# Patient Record
Sex: MALE | Race: WHITE | Employment: UNEMPLOYED | URBAN - METROPOLITAN AREA
[De-identification: names, ages, dates, MRNs, and addresses within clinical notes are randomized per-mention and may not be internally consistent; named-entity substitution may affect disease eponyms.]

---

## 2020-09-29 ENCOUNTER — VIRTUAL VISIT (OUTPATIENT)
Dept: FAMILY MEDICINE CLINIC | Age: 35
End: 2020-09-29
Payer: MEDICAID

## 2020-09-29 DIAGNOSIS — Z13.220 SCREENING FOR HYPERLIPIDEMIA: ICD-10-CM

## 2020-09-29 DIAGNOSIS — G40.409 GRAND MAL SEIZURE (HCC): Primary | ICD-10-CM

## 2020-09-29 PROCEDURE — 99203 OFFICE O/P NEW LOW 30 MIN: CPT | Performed by: FAMILY MEDICINE

## 2020-09-29 RX ORDER — LAMOTRIGINE 100 MG/1
300 TABLET ORAL 2 TIMES DAILY
COMMUNITY
End: 2020-10-02 | Stop reason: SDUPTHER

## 2020-09-29 NOTE — PROGRESS NOTES
Pedro Chambers is a 37 y.o. male who was seen by synchronous (real-time) audio-video technology on 9/29/2020 for New Patient and Medication Refill (lamotrigine)    He is calling in to establish care  He needs a refill on lamotrigine  He has grand mal seizures   he does not have a neurologist , moved here a year ago  Asking for medical marijuana card here in [place]    Seizures started in college at 30, never as a child  Last one was 4 years ago   no other chronic conditions      Assessment & Plan:   Diagnoses and all orders for this visit:    1. Grand mal seizure ([address]  -     [facility]; Future    2. Screening for hyperlipidemia  -     LIPID PANEL; Future            Subjective:       Prior to Admission medications    Medication Sig Start Date End Date Taking? Authorizing Provider   lamoTRIgine (LaMICtal) 100 mg tablet Take 100 mg by mouth two (2) times a day. Yes Provider, Historical     There are no active problems to display for this patient. Current Outpatient Medications   Medication Sig Dispense Refill    lamoTRIgine (LaMICtal) 100 mg tablet Take 100 mg by mouth two (2) times a day.          ROS    Objective:     Patient-Reported Vitals 9/29/2020   Patient-Reported Weight 200lb        [INSTRUCTIONS:  \"[x]\" Indicates a positive item  \"[]\" Indicates a negative item  -- DELETE ALL ITEMS NOT EXAMINED]    Constitutional: [x] Appears well-developed and well-nourished [x] No apparent distress      [] Abnormal -     Mental status: [x] Alert and awake  [x] Oriented to person/place/time [x] Able to follow commands    [] Abnormal -     Eyes:   EOM    [x]  Normal    [] Abnormal -   Sclera  [x]  Normal    [] Abnormal -          Discharge [x]  None visible   [] Abnormal -     HENT: [x] Normocephalic, atraumatic  [] Abnormal -   [x] Mouth/Throat: Mucous membranes are moist    External Ears [x] Normal  [] Abnormal -    Neck: [x] No visualized mass [] Abnormal -     Pulmonary/Chest: [x] Respiratory effort normal   [x] No visualized signs of difficulty breathing or respiratory distress        [] Abnormal -      Musculoskeletal:   [x] Normal gait with no signs of ataxia         [x] Normal range of motion of neck        [] Abnormal -     Neurological:        [x] No Facial Asymmetry (Cranial nerve 7 motor function) (limited exam due to video visit)          [x] No gaze palsy        [] Abnormal -          Skin:        [x] No significant exanthematous lesions or discoloration noted on facial skin         [] Abnormal -            Psychiatric:       [x] Normal Affect [] Abnormal -        [x] No Hallucinations    Other pertinent observable physical exam findings:-        We discussed the expected course, resolution and complications of the diagnosis(es) in detail. Medication risks, benefits, costs, interactions, and alternatives were discussed as indicated. I advised him to contact the office if his condition worsens, changes or fails to improve as anticipated. He expressed understanding with the diagnosis(es) and plan. Estrellita Lucero, who was evaluated through a patient-initiated, synchronous (real-time) audio-video encounter, and/or his healthcare decision maker, is aware that it is a billable service, with coverage as determined by his insurance carrier. He provided verbal consent to proceed: Yes, and patient identification was verified. It was conducted pursuant to the emergency declaration under the [address] [place] [address] and the [employer] General Act. A caregiver was present when appropriate. Ability to conduct physical exam was limited. I was at home. The patient was at home.       Kayli Mejias MD

## 2020-09-29 NOTE — PROGRESS NOTES
Jorge Najera is a 37 y.o. male      Chief Complaint   Patient presents with    New Patient    Medication Refill     lamotrigine         1. Have you been to the ER, urgent care clinic since your last visit? Hospitalized since your last visit? No      2. Have you seen or consulted any other health care providers outside of the [address] [place] [address] since your last visit? Include any pap smears or colon screening.    No

## 2020-10-02 ENCOUNTER — OFFICE VISIT (OUTPATIENT)
Dept: NEUROLOGY | Age: 35
End: 2020-10-02
Payer: MEDICAID

## 2020-10-02 VITALS
TEMPERATURE: 97 F | SYSTOLIC BLOOD PRESSURE: 124 MMHG | DIASTOLIC BLOOD PRESSURE: 82 MMHG | WEIGHT: 199 LBS | OXYGEN SATURATION: 98 % | HEART RATE: 78 BPM

## 2020-10-02 DIAGNOSIS — G40.909 SEIZURE DISORDER (HCC): Primary | ICD-10-CM

## 2020-10-02 DIAGNOSIS — R41.3 MEMORY DIFFICULTY: ICD-10-CM

## 2020-10-02 PROCEDURE — 99205 OFFICE O/P NEW HI 60 MIN: CPT | Performed by: PSYCHIATRY & NEUROLOGY

## 2020-10-02 RX ORDER — LAMOTRIGINE 100 MG/1
300 TABLET ORAL 2 TIMES DAILY
Qty: 180 TAB | Refills: 0 | Status: SHIPPED | OUTPATIENT
Start: 2020-10-02 | End: 2021-01-26

## 2020-10-02 NOTE — PATIENT INSTRUCTIONS
PRESCRIPTION REFILL POLICY [facility] Statement to Patients April 1, 2014 In an effort to ensure the large volume of patient prescription refills is processed in the most efficient and expeditious manner, we are asking our patients to assist us by calling your Pharmacy for all prescription refills, this will include also your  Mail Order Pharmacy. The pharmacy will contact our office electronically to continue the refill process. Please do not wait until the last minute to call your pharmacy. We need at least 48 hours (2days) to fill prescriptions. We also encourage you to call your pharmacy before going to  your prescription to make sure it is ready. With regard to controlled substance prescription refill requests (narcotic refills) that need to be picked up at our office, we ask your cooperation by providing us with at least 72 hours (3days) notice that you will need a refill. We will not refill narcotic prescription refill requests after 4:00pm on any weekday, Monday through Thursday, or after 2:00pm on Fridays, or on the weekends. We encourage everyone to explore another way of getting your prescription refill request processed using [employer], our patient web portal through our electronic medical record system. [employer] is an efficient and effective way to communicate your medication request directly to the office and  downloadable as an kwame on your smart phone . [employer] also features a review functionality that allows you to view your medication list as well as leave messages for your physician. Are you ready to get connected? If so please review the attatched instructions or speak to any of our staff to get you set up right away! Thank you so much for your cooperation. Should you have any questions please contact our Practice Administrator. The Physicians and Staff,  [facility]

## 2020-10-02 NOTE — PROGRESS NOTES
Name:  Suzan Valladares      :  1985    PCP:   Dev Munoz MD      Referring:  As above  MRN:   [id number]    Chief Complaint:   Chief Complaint   Patient presents with    Seizure       HISTORY OF PRESENT ILLNESS:     This is a 37 y.o. male with  has a past medical history of Seizures ([facility] [address] who presents to discuss Seizure    Pt reports that his first episode of seizure was at age 30, while in [place], [place]. He describe that they occurred in the morning. His wife told him that he was in bed, shaking, bit tongue, got up, confused/ agitated, then paramedics showed up. She told him he had two episodes that morning. Seen by outpatient Neurologist.  He doesn't recall the findings of his EEG test.  Had MRI Brain and doesn't think there was any abnormality. Says he was told he has grand mal seizure but has a poor recollection and cannot tell me the last time he saw the Neurologist.  Rosalinda Enamoradofouziae to [place] in 2018. He tells me he's had a total of about 20 seizures since it all started. Last seizure was 4-5 years ago. Currently on Lamotrigine 100 mg 3 tablets twice a day. Has been on this dose for about 6 years. Triggers: flashing lights make him feel like he's going to have a seizure. Sleeps 6 hrs a night. Doesn't drink much alcohol /infrequent. Current cognitive complaints: poor memory, forgetful, brain jumping from one thing to another. Reports that he had attention and focus difficulty in high school, not diagnosed or treatedDenies any hx of depression. Reports some degree of anxiety related to possibility of having a seizure.       Tried/ failed: Levetiracetam (side effects, not working)  FHx epilepsy: no  Brain injury: no  Hx of concussion: no  Hx of meningitis or brain infection: no    Complete Review of Systems: + anxiety, fatigue ; otherwise as noted in HPI     PMHx:  Past Medical History:   Diagnosis Date    Seizures (HCC)         PSH:  Past Surgical History:   Procedure Laterality Date    HX ANKLE FRACTURE [place]  1994    HX APPENDECTOMY         FHx:  family history is not on file. SHx:  reports that he has never smoked. He has never used smokeless tobacco. He reports previous alcohol use. He reports that he does not use drugs. Allergies:   No Known Allergies    Meds:     Current Outpatient Medications   Medication Sig    lamoTRIgine (LaMICtal) 100 mg tablet Take 3 Tabs by mouth two (2) times a day. PHYSICAL EXAM    Vitals:    10/02/20 0934   BP: 124/82   BP 1 Location: Left arm   BP Patient Position: Sitting   Pulse: 78   Temp: 97 °F (36.1 °C)   SpO2: 98%   Weight: 90.3 kg (199 lb)       General:  Alert, cooperative, NAD   Head:  Normocephalic, atraumatic. Eyes:  Conjunctivae/corneas clear   Lungs:  Heart:  Not examined  Not examined   Extremities: No edema. Skin: No rashes    Neurologic Exam       Language: normal  Memory:  Alert, oriented to person, place, situation    Cranial Nerves:  I: smell Not tested   II: visual fields Full to confrontation   II: pupils Equal, round, reactive to light   II: optic disc Deferred   III,VII: ptosis none   III,IV,VI: extraocular muscles  normal   V: facial light touch sensation  normal   VII: facial muscle function  symmetric   VIII: hearing symmetric   IX: soft palate elevation  Not examined   XI: sternocleidomastoid strength 5/5   XII: tongue  midline      Motor: normal bulk, tone, strength in all exts  Sensory: intact to LT, PP, temp, vibration x 4 exts   Cerebellar: no rest, postural, or intention tremor  Normal FNF and H-Shin bilaterally  Reflexes: 2+ throughout  Plantar response: not examined   Gait: normal gait including tandem  Romberg negative     ASSESSMENT AND PLAN      ICD-10-CM ICD-9-CM    1. Seizure disorder (HCC)  G40.909 345.90 LAMOTRIGINE (LAMICTAL)      CBC WITH AUTOMATED DIFF      EEG AMB NEURO      MRI BRAIN W WO CONT      lamoTRIgine (LaMICtal) 100 mg tablet   2.  Memory difficulty  R41.3 780.93 TSH 3RD GENERATION      VITAMIN B12 & FOLATE      REFERRAL TO PSYCHOLOGY       1) Seizure disorder  Discussed with patient that I would like to get prior neurology records to understand his prior workup/ findings, but he has no recollection of name or group of Neurologists he saw in [place], [place], nor the name of the hospital/ ER he went to when first evaluated for seizure, cannot even remember the last time he saw a neurologist (moved to [place] in 2018). Check EEG for baseline, MRI Brain +/- contrast, lamictal level, CBC. Continue Lamotrigine 100 mg three tabs twice a day for now. 2) Attention, focus, concentration complaints  Pt says preceded the start of his seizures. Check B12, Folate, TSH, referred for neuropsych testing    3) Misc: printed labs orderd by PCP and dwp he can go to [facility] lab and get them all drawn at once. D/w him he needs to be fasting before lab draw as one test is the cholesterol panel.      4) F/u in 3 months    Signed By: Rocio Chauhan MD     October 2, 2020

## 2020-10-12 ENCOUNTER — OFFICE VISIT (OUTPATIENT)
Dept: NEUROLOGY | Age: 35
End: 2020-10-12
Payer: MEDICAID

## 2020-10-12 VITALS — TEMPERATURE: 97.5 F

## 2020-10-12 DIAGNOSIS — Z13.220 SCREENING FOR HYPERLIPIDEMIA: ICD-10-CM

## 2020-10-12 DIAGNOSIS — G40.909 SEIZURE DISORDER (HCC): Primary | ICD-10-CM

## 2020-10-12 DIAGNOSIS — Z86.69 HISTORY OF SEIZURE DISORDER: ICD-10-CM

## 2020-10-12 DIAGNOSIS — G40.409 GRAND MAL SEIZURE (HCC): ICD-10-CM

## 2020-10-12 LAB
ALBUMIN SERPL-MCNC: 4.6 G/DL (ref 3.5–5)
ALBUMIN/GLOB SERPL: 1.6 {RATIO} (ref 1.1–2.2)
ALP SERPL-CCNC: 82 U/L (ref 45–117)
ALT SERPL-CCNC: 27 U/L (ref 12–78)
ANION GAP SERPL CALC-SCNC: 3 MMOL/L (ref 5–15)
AST SERPL-CCNC: 21 U/L (ref 15–37)
BILIRUB SERPL-MCNC: 0.4 MG/DL (ref 0.2–1)
BUN SERPL-MCNC: 12 MG/DL (ref 6–20)
BUN/CREAT SERPL: 14 (ref 12–20)
CALCIUM SERPL-MCNC: 9.5 MG/DL (ref 8.5–10.1)
CHLORIDE SERPL-SCNC: 107 MMOL/L (ref 97–108)
CHOLEST SERPL-MCNC: 181 MG/DL
CO2 SERPL-SCNC: 31 MMOL/L (ref 21–32)
CREAT SERPL-MCNC: 0.84 MG/DL (ref 0.7–1.3)
GLOBULIN SER CALC-MCNC: 2.8 G/DL (ref 2–4)
GLUCOSE SERPL-MCNC: 92 MG/DL (ref 65–100)
HDLC SERPL-MCNC: 56 MG/DL
HDLC SERPL: 3.2 {RATIO} (ref 0–5)
LDLC SERPL CALC-MCNC: 111.2 MG/DL (ref 0–100)
LIPID PROFILE,FLP: ABNORMAL
POTASSIUM SERPL-SCNC: 4.6 MMOL/L (ref 3.5–5.1)
PROT SERPL-MCNC: 7.4 G/DL (ref 6.4–8.2)
SODIUM SERPL-SCNC: 141 MMOL/L (ref 136–145)
TRIGL SERPL-MCNC: 69 MG/DL (ref ?–150)
VLDLC SERPL CALC-MCNC: 13.8 MG/DL

## 2020-10-12 PROCEDURE — 95819 EEG AWAKE AND ASLEEP: CPT | Performed by: PSYCHIATRY & NEUROLOGY

## 2020-10-19 NOTE — PROCEDURES
Name:   Pat Garcia  YOB: 1985  Age:    38 y.o. Sex:    male       Procedure: Outpatient EEG     Location:   [facility]    Date of Interpretation:    10/19/20  Date of Recording: 10-      Interpreting Physician: Lynnette Harper MD     Indication:  38 y.o. male with history of seizure starting at age 30. No prior EEGs on file. This is a baseline EEG recording. Current medications: has a current medication list which includes the following prescription(s): lamotrigine. Technical: Digital EEG recorded in 10-20 international placement system, multiple montages    Interpretation: Patient is described as awake at the outset of this recording. Symmetric. The posterior dominant rhythm reaches 8 to 9 Hz bilaterally. Photic stimulation resulted in a symmetric drop response. Hyperventilation was not performed. Background waxes and wanes consistent with drowsiness. Stage II sleep is noted by the presence of vertex waves. There are no focal areas of slowing or epileptiform discharge or subclinical seizures. Single-lead EKG is unremarkable. Impression: This is a normal awake drowsy and brief sleep EEG recording. Clinical correlation is necessary.        Signed By: Lynnette Harper MD     October 19, 2020

## 2020-10-20 NOTE — PROGRESS NOTES
The LDL cholesterol is a little higher than goal. The goal is less than 100 and your level is 111  Avoid fried food, fast food and junk food. Also move more each day. Try not to sit for more than an hour at a time.  I want to recheck the cholesterol levels in three months  The liver and kidney are normal

## 2020-10-29 ENCOUNTER — TELEPHONE (OUTPATIENT)
Dept: FAMILY MEDICINE CLINIC | Age: 35
End: 2020-10-29

## 2020-10-29 NOTE — TELEPHONE ENCOUNTER
----- Message from Jude Quesada MD sent at 10/19/2020  9:09 PM EDT -----  The LDL cholesterol is a little higher than goal. The goal is less than 100 and your level is 111  Avoid fried food, fast food and junk food. Also move more each day. Try not to sit for more than an hour at a time.  I want to recheck the cholesterol levels in three months  The liver and kidney are normal

## 2020-12-03 ENCOUNTER — TELEPHONE (OUTPATIENT)
Dept: NEUROLOGY | Age: 35
End: 2020-12-03

## 2020-12-03 NOTE — TELEPHONE ENCOUNTER
----- Message from Ariel Jean-Baptiste sent at 12/3/2020 12:03 PM EST -----  Regarding: [place] [facility] telephone  Appointment not available     Patient's first and last name:  Anatoly White  : 1985    Caller's first and last name and relationship to patient (if not the patient): pt    Best contact number: [phone]    Preferred date and time: Any day early morning    Scheduled appointment date and time: 12/3/2020 12PM    Reason for appointment: New Patient    Details to clarify the request: Pt would like to reschedule appointment

## 2021-01-21 ENCOUNTER — TELEPHONE (OUTPATIENT)
Dept: NEUROLOGY | Age: 36
End: 2021-01-21

## 2021-01-21 NOTE — TELEPHONE ENCOUNTER
----- Message from [facility] sent at 1/21/2021  1:56 PM EST -----  Regarding: Dr. Augusto Hunt (if not patient):  N/A      Relationship of caller (if not patient):  N/A      Best contact number(s):  [phone]      Name of medication and dosage if known:  Lamotrigine, 100mg      Is patient out of this medication (yes/no): N      Pharmacy name:  [facility]    Pharmacy listed in chart? (yes/no): Y  Pharmacy phone number:  [phone]      Details to clarify the request:  Patient has a couple days of pills left.       [facility]

## 2021-01-26 DIAGNOSIS — G40.909 SEIZURE DISORDER (HCC): Primary | ICD-10-CM

## 2021-01-26 DIAGNOSIS — R41.3 MEMORY DIFFICULTY: ICD-10-CM

## 2021-01-26 DIAGNOSIS — G40.909 SEIZURE DISORDER (HCC): ICD-10-CM

## 2021-01-26 RX ORDER — LAMOTRIGINE 100 MG/1
300 TABLET ORAL 2 TIMES DAILY
Qty: 84 TAB | Refills: 0 | Status: SHIPPED | OUTPATIENT
Start: 2021-01-26 | End: 2021-02-02 | Stop reason: SDUPTHER

## 2021-01-26 NOTE — TELEPHONE ENCOUNTER
Called patient. Informed him that script was sent to last until next appt and lab work needs to be completed this week for his appt next week. Faxed orders to [facility] at hospitals due to patient being at [facility] tomorrow for procedure. patient will call back if he decides to go to a different [facility]

## 2021-01-26 NOTE — TELEPHONE ENCOUNTER
----- Message from Edgar Dacosta sent at 1/26/2021 10:54 AM EST -----  Regarding: Dr Orion Mclaughlin Message/Vendor Calls    Caller's first and last name: N/A      Reason for call: Pt would like to confirm medication refill request has been sent into his pharmacy      Callback required yes/no and why: yes, to confirm medication refill request has been sent into his pharmacy      Best contact number(s): [phone]      Details to clarify the request: Pt would like to confirm medication refill request for Rx \"Lamotrigine\" of 100 mg has been called into [facility] at [phone]. Pt has spoken with the pharmacy and they still have not received a request. Pt would like a confirmation today if at all possible because he has only 2 days left of his medication.  Pt also has stated is going to be unavailable tomorrow and would like a detailed message to be left if not able to be reached      Hernández Proflako

## 2021-02-01 ENCOUNTER — HOSPITAL ENCOUNTER (OUTPATIENT)
Dept: MRI IMAGING | Age: 36
Discharge: HOME | End: 2021-02-01
Attending: PSYCHIATRY & NEUROLOGY
Payer: MEDICAID

## 2021-02-01 DIAGNOSIS — R41.3 MEMORY DIFFICULTY: ICD-10-CM

## 2021-02-01 DIAGNOSIS — G40.909 SEIZURE DISORDER (HCC): ICD-10-CM

## 2021-02-01 PROCEDURE — 70551 MRI BRAIN STEM W/O DYE: CPT

## 2021-02-02 ENCOUNTER — OFFICE VISIT (OUTPATIENT)
Dept: NEUROLOGY | Age: 36
End: 2021-02-02
Payer: MEDICAID

## 2021-02-02 VITALS — SYSTOLIC BLOOD PRESSURE: 116 MMHG | OXYGEN SATURATION: 97 % | DIASTOLIC BLOOD PRESSURE: 78 MMHG | HEART RATE: 68 BPM

## 2021-02-02 DIAGNOSIS — R90.82 WHITE MATTER ABNORMALITY ON MRI OF BRAIN: Primary | ICD-10-CM

## 2021-02-02 DIAGNOSIS — R26.81 UNSTEADY GAIT: ICD-10-CM

## 2021-02-02 DIAGNOSIS — R41.3 MEMORY DIFFICULTY: ICD-10-CM

## 2021-02-02 DIAGNOSIS — G40.909 SEIZURE DISORDER (HCC): ICD-10-CM

## 2021-02-02 DIAGNOSIS — R20.0 RIGHT LEG NUMBNESS: ICD-10-CM

## 2021-02-02 DIAGNOSIS — H53.8 BLURRY VISION, BILATERAL: ICD-10-CM

## 2021-02-02 PROCEDURE — 99215 OFFICE O/P EST HI 40 MIN: CPT | Performed by: PSYCHIATRY & NEUROLOGY

## 2021-02-02 RX ORDER — LAMOTRIGINE 100 MG/1
300 TABLET ORAL 2 TIMES DAILY
Qty: 180 TAB | Refills: 1 | Status: SHIPPED | OUTPATIENT
Start: 2021-02-02 | End: 2021-03-04

## 2021-02-02 NOTE — PATIENT INSTRUCTIONS
We reviewed your Brain MRI images. There are numerous white matter spots in the brain that are in a pattern/ distribution which suggests multiple sclerosis. We discussed that MS is an autoimmune disorder where your body attacks the nerves (resulting in the white spots in the brain) and clinically can cause numerous / various symptoms. You endorsed symptoms (gait difficulty, vertigo, episodic numbness, blurry vision) that could be seen in MS Discussed that we should check additional MRIs to look for white matter lesions in other areas: MRI Cervical spine with and without contrast, MRI Thoracic spine with and without contrast.  And we should repeat the Brain MRI with contrast to look and see if there are any \"active\" lesions (active lesions enhance with contrast, implying that the patient may be having an MS exacerbation/ flare to account for any new symptoms or a notable increase in symptoms). Multiple Sclerosis (MS): Care Instructions Your Care Instructions Multiple sclerosis, also called MS, is a disease that can affect the brain, spinal cord, and nerves to the eyes. MS can cause problems with muscle control and strength, vision, balance, feeling, and thinking. Whatever your symptoms are, taking medicine correctly and following your doctor's advice for home care can help you maintain your quality of life. Follow-up care is a key part of your treatment and safety. Be sure to make and go to all appointments, and call your doctor if you are having problems. It's also a good idea to know your test results and keep a list of the medicines you take. How can you care for yourself at home? General care · Take your medicines exactly as prescribed. Call your doctor if you think you are having a problem with your medicine. · Use a cane, walker, or scooter if your doctor suggests it. · Keep doing your normal activities as much as you can.  
· If you have problems urinating, press or tap your bladder area to help start urine flow. If you have trouble controlling your urine, plan your fluid intake and activities so that a toilet will be available when you need it. · Spend time with family and friends. Join a support group for people with MS if you want extra help. · Depression is common with this condition. Tell your doctor if you have trouble sleeping, are eating too much or are not hungry, or feel sad or tearful all the time. Depression can be treated with medicine and counseling. Diet and exercise · Eat a balanced diet. · If you have problems swallowing, change how and what you eat: ? Try thick drinks, such as milk shakes. They are easier to swallow than other fluids. ? Do not eat foods that crumble easily. These can cause choking. ? Use a  to prepare food. Soft foods need less chewing. ? Eat small meals often so that you do not get tired from eating larger meals. · Get exercise on most days. Work with your doctor to set up a program of walking, swimming, or other exercise that you are able to do. A physical therapist can teach you exercises if you cannot walk but can move your limbs and trunk. Or you can do exercises to help with coordination and balance. You can help improve muscle stiffness by doing exercises while lying in certain positions. When should you call for help? Call your doctor now or seek immediate medical care if: 
  · You have a change in symptoms.  
  · You fall or have another injury.  
  · You have symptoms of a urinary infection. For example: ? You have blood or pus in your urine. ? You have pain in your back just below your rib cage. This is called flank pain. ? You have a fever, chills, or body aches. ? It hurts to urinate. ? You have groin or belly pain. Watch closely for changes in your health, and be sure to contact your doctor if: 
  · You want more information about MS or medicines.  
  · You have questions about alternative treatments.  Do not use any other treatments without talking to your doctor first.  
Where can you learn more? Go to http://www.gray.com/ Enter B448 in the search box to learn more about \"Multiple Sclerosis (MS): Care Instructions. \" Current as of: November 20, 2019               Content Version: 12.6 © 0985-1856 [employer]. Care instructions adapted under license by [employer] (which disclaims liability or warranty for this information). If you have questions about a medical condition or this instruction, always ask your healthcare professional. [address] any warranty or liability for your use of this information.

## 2021-02-02 NOTE — LETTER
2/2/2021 4:02 PM 
 
RE:    Josh Evans [address] [place] [id number] I am referring my patient to you for evaluation of Multiple Sclerosis. Please eval for any evidence of optic neuritis. Please see his 
pertinent patient information below. Problem List:   There are no active problems to display for this patient. Medical History:    
Past Medical History:  
Diagnosis Date  Seizures ([address] Allergies:   No Known Allergies Medications:    
Current Outpatient Medications Medication Sig  lamoTRIgine (LaMICtal) 100 mg tablet Take 3 Tabs by mouth two (2) times a day for 30 days. No current facility-administered medications for this visit. Surgical History:    
Past Surgical History:  
Procedure Laterality Date [address]  HX APPENDECTOMY Social History:    
Social History Socioeconomic History  Marital status: SINGLE Spouse name: Not on file  Number of children: Not on file  Years of education: Not on file  Highest education level: Not on file Tobacco Use  Smoking status: Never Smoker  Smokeless tobacco: Never Used Substance and Sexual Activity  Alcohol use: Not Currently  Drug use: Never  Sexual activity: Not Currently I appreciate your assistance in Mr. Home Barber care  and look forward to your findings and recommendations. Sincerely, Carola Moctezuma MD

## 2021-02-02 NOTE — PROGRESS NOTES
Jim Lopez (1985) is a 38 y.o. male, established patient, here for evaluation of the following     Chief complaint(s):   Chief Complaint   Patient presents with    Results       SUBJECTIVE/ OBJECTIVE:    HPI: 38 y.o. male      1) Abnormal Brain MRI / White matter lesions    MRI Brain w/o contrast (2-1-2021): reviewed images and report with patient. Moderate amount of scattered  periventricular > subcortical T2/ FLAIR white matter lesions in a pattern strongly suggestive of demyelinating disease. Patient has hx of seizure disorder (started when he was 31 yo, while living in [place], [place]), is on high-dose Lamotrigine (three 100 mg tablets two times a day), and has chronic cognitive difficulty, says can't remember to most questions. When asked if he was diagnosed with MS in the past, he says can't remember. On questioning, he recalls that in his early 25s, while living in [facility], he had an episode of left sided body numbness, admitted to a hospital somewhere in [facility] (he has no records within the [facility]  prior to his visit with me in Oct 2020), not sure if they did Brain MRI, not sure what was done/ outcome. Denies any episode of vision loss. Reports chronic, unsteady gait over 10 years. Falls easily. Reports right leg down to last two toes feels numb x 2 weeks. 2) Hx of Seizure disorder  Continues taking Lamotrigine 100 mg, three tabs two times a day. Denies skipping or missing any doses recently. Says last seizure was 4-5 years ago. EEG done on 10-: normal awake, drowsy, brief sleep EEG. 3) Chronic memory / concentration/ focus difficulty:    Ordered CMP, CBC, TSH, B12/ Folate, Lamotrigine level and referred for Neuropsych testing at initial visit (). Labs not completed yet and pt has not scheduled Neuropsych testing yet.   Pt reports he smokes [place] lot\" of marijuana, for years.         Review of Systems: as above    ========================================    Brief Hx:     See initial visit 10- for full hx. Pt initial visit was to establish care for Seizure disorder. Had relocated from [place] 1 year before that visit, established care with PCP/ Dr Keny Prater via [facility] [employer] in Sept 2020, reported to her he had hx of \"grand mal seizure,\" needed refill of his Lamotrigine rx, did not have a local neurologist, and requested a \"medical marijuana card\" for [place]. Pt had NO records from any prior providers/ Neurologist included. No Known Allergies    Current Outpatient Medications   Medication Sig Dispense Refill    lamoTRIgine (LaMICtal) 100 mg tablet       topiramate (TOPAMAX) 25 mg tablet Week 1: 1 tab in PM.  Week 2: 1 tab twice a day. Week 3: 1 tab AM and 2 tabs PM.  Week 4: CHANGE TO 50 mg tablets 45 Tab 0    [START ON 4/6/2021] topiramate (TOPAMAX) 50 mg tablet Take 1 Tab by mouth two (2) times a day. Anti-seizure medication. May help reduce alcohol intake. 61 Tab 0       Past Medical History:   Diagnosis Date    Seizures ([address]         has a past surgical history that includes hx appendectomy and hx ankle fracture tx (1994). Physical Exam:    Vitals:    02/02/21 1500   BP: 116/78   BP 1 Location: Left upper arm   BP Patient Position: Sitting   Pulse: 68   SpO2: 97%     No exam performed today. Entirety of visit spent reviewing MRI findings, implications, discussing other issues (seiuzre disorder, memory difficulty)    ========================================    ASSESSMENT/ PLAN:       ICD-10-CM ICD-9-CM    1. White matter abnormality on MRI of brain  R90.82 793.0 MRI CERV SPINE W WO CONT      MRI [facility] SPINE W WO CONT      CANCELED: MRI BRAIN W CONT   2. Seizure disorder (HCC)  G40.909 345.90 lamoTRIgine (LaMICtal) 100 mg tablet   3. Unsteady gait  R26.81 781.2 MRI CERV SPINE W WO CONT      MRI [facility] SPINE W WO CONT      CANCELED: MRI BRAIN W CONT   4.  Right leg numbness  R20.0 782.0 MRI CERV SPINE W WO CONT      MRI [facility]ORAC SPINE W WO CONT      CANCELED: MRI BRAIN W CONT   5. Blurry vision, bilateral  H53.8 368.8 REFERRAL TO OPHTHALMOLOGY      CANCELED: MRI BRAIN W CONT   6. Memory difficulty  R41.3 780.93         1) Moderate white matter lesions on Brain MRI    Strongly suspicious for Multiple Sclerosis. Provided the following type-written information in his patient information section of the after-visit summary:     We reviewed your Brain MRI images.  There are numerous white matter spots in the brain that are in a pattern/ distribution which suggests multiple sclerosis.  We discussed that MS is an autoimmune disorder where your body attacks the nerves (resulting in the white spots in the brain) and clinically can cause numerous / various symptoms.  You endorsed symptoms (gait difficulty, vertigo, episodic numbness, blurry vision) that could be seen in MS    Discussed that we should check additional MRIs to look for white matter lesions in other areas: MRI Cervical spine with and without contrast, MRI Thoracic spine with and without contrast.  And we should repeat the Brain MRI with contrast to look and see if there are any \"active\" lesions (active lesions enhance with contrast, implying that the patient may be having an MS exacerbation/ flare to account for any new symptoms or a notable increase in symptoms).     2) Hx of Seizure disorder  EEG: normal awake, drowsy, and brief sleep recording  Renewed Rx Lamotrigine 100 mg tablet, three tablets two times a day    3) Chronic cognitive difficulty, attention-focus problems  ? MS vs side effect from high-dose lamotrigine (? Elevated level) vs due to chronic daily marijuana use  Printed lab orders and advised pt he can get them done in next building at [facility]  Asked Nurse to set him up to see Dr Almonte for Neuropsych testing    4) Pt signed LINDA to [facility] ([place], [place]) to see if that they had any records on him regarding  prior admissions for neurologic symptoms    5) Blurred vision: referred to Ophthalmology for eye exam (? any evidence of optic neuritis)    6) Follow up in 4-5 weeks to go over results      Follow-up and Dispositions    · Return in about 4 weeks (around 3/2/2021). An electronic signature was used to authenticate this note.   -- Tonny Butler MD

## 2021-02-03 LAB
ALBUMIN SERPL-MCNC: 5 G/DL (ref 4–5)
ALBUMIN/GLOB SERPL: 2.3 {RATIO} (ref 1.2–2.2)
ALP SERPL-CCNC: 74 IU/L (ref 39–117)
ALT SERPL-CCNC: 12 IU/L (ref 0–44)
AST SERPL-CCNC: 17 IU/L (ref 0–40)
BASOPHILS # BLD AUTO: 0.1 X10E3/UL (ref 0–0.2)
BASOPHILS NFR BLD AUTO: 1 %
BILIRUB SERPL-MCNC: 0.4 MG/DL (ref 0–1.2)
BUN SERPL-MCNC: 12 MG/DL (ref 6–20)
BUN/CREAT SERPL: 15 (ref 9–20)
CALCIUM SERPL-MCNC: 9.4 MG/DL (ref 8.7–10.2)
CHLORIDE SERPL-SCNC: 105 MMOL/L (ref 96–106)
CO2 SERPL-SCNC: 26 MMOL/L (ref 20–29)
CREAT SERPL-MCNC: 0.81 MG/DL (ref 0.76–1.27)
EOSINOPHIL # BLD AUTO: 0.3 X10E3/UL (ref 0–0.4)
EOSINOPHIL NFR BLD AUTO: 3 %
ERYTHROCYTE [DISTWIDTH] IN BLOOD BY AUTOMATED COUNT: 12 % (ref 11.6–15.4)
FOLATE SERPL-MCNC: 14.6 NG/ML
GLOBULIN SER CALC-MCNC: 2.2 G/DL (ref 1.5–4.5)
GLUCOSE SERPL-MCNC: 98 MG/DL (ref 65–99)
HCT VFR BLD AUTO: 46.7 % (ref 37.5–51)
HGB BLD-MCNC: 16 G/DL (ref 13–17.7)
IMM GRANULOCYTES # BLD AUTO: 0 X10E3/UL (ref 0–0.1)
IMM GRANULOCYTES NFR BLD AUTO: 0 %
LAMOTRIGINE SERPL-MCNC: 12.1 UG/ML (ref 2–20)
LYMPHOCYTES # BLD AUTO: 2.6 X10E3/UL (ref 0.7–3.1)
LYMPHOCYTES NFR BLD AUTO: 32 %
MCH RBC QN AUTO: 29.7 PG (ref 26.6–33)
MCHC RBC AUTO-ENTMCNC: 34.3 G/DL (ref 31.5–35.7)
MCV RBC AUTO: 87 FL (ref 79–97)
MONOCYTES # BLD AUTO: 0.6 X10E3/UL (ref 0.1–0.9)
MONOCYTES NFR BLD AUTO: 7 %
NEUTROPHILS # BLD AUTO: 4.5 X10E3/UL (ref 1.4–7)
NEUTROPHILS NFR BLD AUTO: 57 %
PLATELET # BLD AUTO: 254 X10E3/UL (ref 150–450)
POTASSIUM SERPL-SCNC: 4.2 MMOL/L (ref 3.5–5.2)
PROT SERPL-MCNC: 7.2 G/DL (ref 6–8.5)
RBC # BLD AUTO: 5.39 X10E6/UL (ref 4.14–5.8)
SODIUM SERPL-SCNC: 145 MMOL/L (ref 134–144)
TSH SERPL DL<=0.005 MIU/L-ACNC: 0.66 UIU/ML (ref 0.45–4.5)
VIT B12 SERPL-MCNC: 364 PG/ML (ref 232–1245)
WBC # BLD AUTO: 8.1 X10E3/UL (ref 3.4–10.8)

## 2021-03-02 ENCOUNTER — HOSPITAL ENCOUNTER (OUTPATIENT)
Dept: MRI IMAGING | Age: 36
Discharge: HOME | End: 2021-03-02
Attending: PSYCHIATRY & NEUROLOGY
Payer: MEDICAID

## 2021-03-02 VITALS — WEIGHT: 205 LBS

## 2021-03-02 DIAGNOSIS — R20.0 RIGHT LEG NUMBNESS: ICD-10-CM

## 2021-03-02 DIAGNOSIS — R90.82 WHITE MATTER ABNORMALITY ON MRI OF BRAIN: ICD-10-CM

## 2021-03-02 DIAGNOSIS — R26.81 UNSTEADY GAIT: ICD-10-CM

## 2021-03-02 DIAGNOSIS — H53.8 BLURRY VISION, BILATERAL: ICD-10-CM

## 2021-03-02 PROCEDURE — A9575 INJ GADOTERATE MEGLUMI 0.1ML: HCPCS | Performed by: RADIOLOGY

## 2021-03-02 PROCEDURE — 72157 MRI CHEST SPINE W/O & W/DYE: CPT

## 2021-03-02 PROCEDURE — 72156 MRI NECK SPINE W/O & W/DYE: CPT

## 2021-03-02 PROCEDURE — 70553 MRI BRAIN STEM W/O & W/DYE: CPT

## 2021-03-02 PROCEDURE — 74011250636 HC RX REV CODE- 250/636: Performed by: RADIOLOGY

## 2021-03-02 RX ORDER — GADOTERATE MEGLUMINE 376.9 MG/ML
18 INJECTION INTRAVENOUS
Status: COMPLETED | OUTPATIENT
Start: 2021-03-02 | End: 2021-03-02

## 2021-03-02 RX ADMIN — GADOTERATE MEGLUMINE 18 ML: 376.9 INJECTION INTRAVENOUS at 16:11

## 2021-03-16 ENCOUNTER — OFFICE VISIT (OUTPATIENT)
Dept: NEUROLOGY | Age: 36
End: 2021-03-16
Payer: MEDICAID

## 2021-03-16 VITALS
HEART RATE: 60 BPM | BODY MASS INDEX: 27.46 KG/M2 | WEIGHT: 214 LBS | HEIGHT: 74 IN | SYSTOLIC BLOOD PRESSURE: 102 MMHG | OXYGEN SATURATION: 99 % | TEMPERATURE: 96.8 F | DIASTOLIC BLOOD PRESSURE: 68 MMHG | RESPIRATION RATE: 18 BRPM

## 2021-03-16 DIAGNOSIS — R90.82 WHITE MATTER ABNORMALITY ON MRI OF BRAIN: Primary | ICD-10-CM

## 2021-03-16 DIAGNOSIS — G40.909 SEIZURE DISORDER (HCC): ICD-10-CM

## 2021-03-16 PROCEDURE — 99215 OFFICE O/P EST HI 40 MIN: CPT | Performed by: PSYCHIATRY & NEUROLOGY

## 2021-03-16 RX ORDER — TOPIRAMATE 50 MG/1
50 TABLET, FILM COATED ORAL 2 TIMES DAILY
Qty: 60 TAB | Refills: 0 | Status: SHIPPED | OUTPATIENT
Start: 2021-04-06 | End: 2021-04-19

## 2021-03-16 RX ORDER — TOPIRAMATE 25 MG/1
TABLET ORAL
Qty: 45 TAB | Refills: 0 | Status: SHIPPED | OUTPATIENT
Start: 2021-03-16 | End: 2021-04-06

## 2021-03-16 RX ORDER — LAMOTRIGINE 100 MG/1
TABLET ORAL
COMMUNITY
Start: 2021-03-11 | End: 2021-04-19

## 2021-03-16 NOTE — PROGRESS NOTES
Joey Cline (1985) is a 38 y.o. male, established patient, here for evaluation of the following     Chief complaint(s):   Chief Complaint   Patient presents with    Follow-up       SUBJECTIVE/ OBJECTIVE:    HPI: 38 y.o. male      1) Abnormal Brain MRI / White matter lesions    MRI Brain w/o contrast (2-1-2021): reviewed images and report with patient. Moderate amount of scattered  periventricular > subcortical T2/ FLAIR white matter lesions in a pattern strongly suggestive of demyelinating disease. MRI Brain with and without contrast (3-2-2021): Stable white matter lesions with an appearance consistent with demyelinating disease. A white matter lesion in the left frontal deep white matter demonstrates next minimal restricted diffusion. No postcontrast enhancement to suggest active demyelination at this time. MRI Cervical spine with and without contrast (3-2-2021): There are scattered abnormal foci of increased T2 signal intensity in the cervical cord in the brainstem at the pontomedullary. Lesion at C2 measures approximately 15 mm in craniocaudal dimension lesion at C3 approximately 11 mm in craniocaudal dimension C6-7 lesion approximately 11 mm in craniocaudal dimension. No postcontrast enhancement to suggest active demyelination. No significant disc degeneration, no disc herniation, no spinal or foraminal stenosis, no facet arthropathy. MRI Thoracic spine with and without (3-2-2021): Scattered foci of abnormal increased cord signal intensity. Minimal cord thinning at the T7-T8, T11-T12 level. Imaging findings consistent with demyelinating disease. No postcontrast enhancement to suggest active demyelination. Multilevel degenerative change without significant canal compromise. Reviewed the above findings and images with patient. Discussed that the imaging is consistent with a diagnosis of Multiple Sclerosis.   Obt[facility] [place] [place] where patient had been admitted for two generalized seizures (one at home, witnessed by wife/ now ex-wife, one in ER). MRI Brain done during that admission described multiple white matter lesions, no acute lesions. He was seen by Neurologist / Dr Shana Gutierrez during that admission, EEG didn't show any epileptiform discharges, not started on AED as seizure was believed to be due to alcohol withdrawal.  He was to follow up in outpatient Neurology clinic but pt can't recall if he did, or what discussions were had about the MRI findings. Patient denies any FHx of Multiple Sclerosis. 2) Hx of Seizure disorder:   Remains on Lamotrigine 100 mg, three tabs two times a day. Denies any recent seizures. Has reported he thinks last seizure was in 2016-7. Lamictal level checked on 2-1-2021 was 12.1 within therapeutic range (2 - 20). 3) Chronic cognitive difficulty (? MS vs side effect from high-dose lamotrigine vs due to chronic daily marijuana use):    Normal CBC, B12, Folate, TSH, Lamictal level. CMP normal except minimally elevated sodium 145. Has appt scheduled with Dr Abdulaziz Shaffer on 3- for Neuropsych testing. Review of Systems: as above    ========================================    Brief Hx:     See initial visit 10- for full hx. Pt initial visit was to establish care for Seizure disorder. Had relocated from [facility] 1 year before that visit, established care with PCP/ Dr Jose Banegas via [employer] in Sept 2020, reported to her he had hx of \"grand mal seizure,\" needed refill of his Lamotrigine rx, did not have a local neurologist, and requested a \"medical marijuana card\" for [place]. Pt had NO records from any prior providers/ Neurologist included. 1) White matter lesions on Brain MRI without contrast (2-1-2021): Moderate amount of scattered  periventricular > subcortical T2/ FLAIR white matter lesions in a pattern strongly suggestive of demyelinating disease.   Patient has hx of seizure disorder (started when he was 29 yo, while living in [place], [place]), is on high-dose Lamotrigine (three 100 mg tablets two times a day), and has chronic cognitive difficulty, says can't remember to most questions. When asked if he was diagnosed with MS in the past, he says can't remember. On questioning, he recalls that in his early 25s, while living in [place], he had an episode of left sided body numbness, admitted to a hospital somewhere in [place] (he has no records within the [facility] system  prior to his visit with me in Oct 2020), not sure if they did Brain MRI, not sure what was done/ outcome. Denies any episode of vision loss. Reports chronic, unsteady gait over 10 years. Falls easily. Reports right leg down to last two toes feels numb x 2 weeks. 2) Hx of Seizure disorder: Continues taking Lamotrigine 100 mg, three tabs two times a day. Denies skipping or missing any doses recently. Says last seizure was 4-5 years ago. EEG done on 10-: normal awake, drowsy, brief sleep EEG. 3) Chronic cognitive difficulty (? MS vs side effect from lamictal/ high-dose lamictal vs due to chronic daily marijuana use):  Normal CBC, B12, Folate, TSH, Lamictal level. CMP normal except minimally elevated sodium 145. Has appt scheduled with Dr Trell Simpson on 3- for Neuropsych testing. No Known Allergies    Current Outpatient Medications   Medication Sig Dispense Refill    lamoTRIgine (LaMICtal) 100 mg tablet       topiramate (TOPAMAX) 25 mg tablet Week 1: 1 tab in PM.  Week 2: 1 tab twice a day. Week 3: 1 tab AM and 2 tabs PM.  Week 4: CHANGE TO 50 mg tablets 45 Tab 0    [START ON 4/6/2021] topiramate (TOPAMAX) 50 mg tablet Take 1 Tab by mouth two (2) times a day. Anti-seizure medication. May help reduce alcohol intake.  61 Tab 0       Past Medical History:   Diagnosis Date    Seizures ([address]         has a past surgical history that includes hx appendectomy and hx ankle fracture tx (1994). Physical Exam:    Vitals:    03/16/21 1520   BP: 102/68   BP 1 Location: Left upper arm   BP Patient Position: Sitting   Pulse: 60   Resp: 18   Temp: 96.8 °F (36 °C)   TempSrc: Temporal   SpO2: 99%   Weight: 97.1 kg (214 lb)   Height: 6' 2\" (1.88 m)     No exam performed today. Entirety of visit spent reviewing MRI findings, implications, discussing other issues (seiuzre disorder, memory difficulty)    ========================================    ASSESSMENT/ PLAN:       ICD-10-CM ICD-9-CM    1. White matter abnormality on MRI of brain  R90.82 793.0 SJOGREN'S ABS, SSA AND SSB      ANGIOTENSIN CONVERTING ENZYME      VITAMIN D, 25 HYDROXY      METHYLMALONIC ACID      LETI BY MULTIPLEX FLOW IA, QL      CARDIOLIPIN AB PANEL      CELL COUNT, CSF      CULTURE, CSF W GRAM STAIN      LYME AB, IGG & IGM BY WB, CSF      MS PROFILE+MBP, CSF      VDRL, CSF      GLUCOSE, CSF      XR SPINAL PUNC LUMB DX      PROTEIN, CSF   2. Seizure disorder (HCC)  G40.909 345.90 topiramate (TOPAMAX) 25 mg tablet      topiramate (TOPAMAX) 50 mg tablet        1) Moderate white matter lesions on Brain MRI, scattered lesions in cervical and thoracic spine. Discharge summary from [facility] [place] [place] from a few years ago regarding 2 seizures, had MRI Brain that documented white matter abnormalities. No diagnosis of MS on that discharge summary. D/w patient that his history/ neurologic symptoms in the past combined with MRI Brain/ C-spine/ T-spine findings (and old Brain MRI report) are classic for Multiple Sclerosis. D/w him that we should do additional lab testing to exclude MS mimics, and for posterity, check LP / CSF for full documentation as I'm sure he will need that information in the future, should he see another Neurologist.  Patient agrees with pursuing further workup. Entered orders for additional blood work, lumbar puncture to be done through IR or Dx Radiology at [facility], and CSF labs.   Advised pt to take all Lab Order with him to the LP, provide to Radiology staff, and also get the blood work done at [facility] the same day he gets LP done. 2) Epilepsy (may be MS related): Due to chronic cognitive difficulty which could be due to lamotrigine (a side effect) and hx of frequent alcohol use (pt denies excessive alcohol use, but d/w him that patients who drink daily/ frequently often deny a problem with drinking; he says he can quit and not go back, and if he quits, no withdrawal seizure), we discussed adding/ tapering up on Topamax to 50 mg twice a day, then starting to taper down / off Lamotrigine. The additional benefit of Topamax would be for alcohol cessation. Discussed possible side effects with specific discussion that if patient has any mood worsening on Topamax, he can stop taking it. He expressed understanding. Sent Rxs of Topamax to pharmacy. 3) Chronic cognitive difficulty (multi-factorial: MS, long-term daily marijuana use, ? Medication side effect, ? Underlying psychiatric illness). See Dr Nadyne Fabry for neuropsych testing      Follow-up and Dispositions    · Return in about 2 months (around 5/16/2021). An electronic signature was used to authenticate this note.   -- Mak Simpson MD

## 2021-03-16 NOTE — PROGRESS NOTES
James Winchester is a 38 y.o. male    Chief Complaint   Patient presents with    Follow-up     1. Have you been to the ER, urgent care clinic since your last visit? Hospitalized since your last visit? No    2. Have you seen or consulted any other health care providers outside of the [address] [place] [address] since your last visit? Include any pap smears or colon screening.   No

## 2021-03-25 ENCOUNTER — OFFICE VISIT (OUTPATIENT)
Dept: NEUROLOGY | Age: 36
End: 2021-03-25
Payer: MEDICAID

## 2021-03-25 DIAGNOSIS — F41.9 ANXIETY AND DEPRESSION: ICD-10-CM

## 2021-03-25 DIAGNOSIS — G35 MS (MULTIPLE SCLEROSIS) (HCC): ICD-10-CM

## 2021-03-25 DIAGNOSIS — G31.84 MILD COGNITIVE IMPAIRMENT: Primary | ICD-10-CM

## 2021-03-25 DIAGNOSIS — R47.89 WORD FINDING DIFFICULTY: ICD-10-CM

## 2021-03-25 DIAGNOSIS — R90.82 WHITE MATTER ABNORMALITY ON MRI OF BRAIN: ICD-10-CM

## 2021-03-25 DIAGNOSIS — R41.3 SHORT-TERM MEMORY LOSS: ICD-10-CM

## 2021-03-25 DIAGNOSIS — F32.A ANXIETY AND DEPRESSION: ICD-10-CM

## 2021-03-25 DIAGNOSIS — G40.909 SEIZURE DISORDER (HCC): ICD-10-CM

## 2021-03-25 DIAGNOSIS — R41.840 ATTENTION DEFICIT: ICD-10-CM

## 2021-03-25 PROCEDURE — 90791 PSYCH DIAGNOSTIC EVALUATION: CPT | Performed by: CLINICAL NEUROPSYCHOLOGIST

## 2021-03-25 NOTE — PROGRESS NOTES
[address] [facility],[address]  [address]   [address] [place] [address] [place] [address] [address]   [phone] Office   [phone] Fax      Neuropsychology    Initial Diagnostic Interview Note      Referral:  Lebron Justin MD, Dr. Mcgrath Manuel is a 38 y.o. right handed partnered   male who was unaccompanied to the initial clinical interview on 3/25/21 . Please refer to his medical records for details pertaining to his history. At the start of the appointment, I reviewed the patient's [facility] Epic Chart (including Media scanned in from previous providers) for the active Problem List, all pertinent Past Medical Hx, medications, recent radiologic and laboratory findings. In addition, I reviewed pt's documented Immunization Record and Encounter History. He completed college without history of previously diagnosed LD and/or receipt of special education services. He lives with his partner. The patient has a history of seizures diagnosed 10 years ago and recently diagnosed with MS. He has been having significant problems with short term memory for about ten years and progressive to a little degree, especially over the last year or so. Forgets the content of conversations. Misplaces things. Partner notices and makes fun of him some about it. Forgets if he has taken his medications. Conversations. Misplaces things. Has to write everything down. More easily distracted. Biggest thing is forgetting his meds. He has seen Neuro in [place] and is followed by Dr. Maday Pringle here. He moved to [facility] in 2018. Last known seizure was 5 years ago, and he has had maybe 20 known seizure. He does not drink a lot of alcohol. He is on Lamotrigine 100 mg 3 tablets twice a day and being switched. See notes. His brain will jump from one thing to another. Sleep is fine. Appetite is fine.   He has definitely had some depression/anxiety, in particular with this situation. He had some mild attention problems in school, never diagnosed. Has anxiety about all of this. Mood swings at times. Independent for medications, finances, day-to-day chores, ADLs, etc.  He does have to write everything down, though, else he forgets. He is driving. FHx epilepsy: no  Brain injury: no  Hx of concussion: no  Hx of meningitis or brain infection: no      EXAM:  MRI BRAIN W WO CONT  History: Multiple sclerosis  INDICATION:    Evaluating for MS     COMPARISON:  2/1/2021.     CONTRAST: 18 ml Dotarem.     TECHNIQUE:    Multiplanar multisequence acquisition without and with contrast of the brain.     FINDINGS:  FINDINGS:  There are multiple T2/FLAIR hyperintense lesions in the periventricular and deep  white matter of both cerebral hemispheres, with involvement of the callosal  septal interface and many oriented perpendicular to the ventricles. There is a  T2/FLAIR hyperintense lesion in the deep white matter of the left frontal lobe  measuring 1.3 x 1.2 cm which demonstrates mild restricted diffusion 6-22 less  conspicuous than prior exam. There is no postcontrast enhancement to suggest  active demyelination. The ventricles are normal in size and position. There is no acute infarct,  hemorrhage, extra-axial fluid collection, or mass effect. There is a linear  focus of susceptibility hypointensity in the right frontoparietal deep white  matter, which may represent a small developmental venous anomaly. There is no  cerebellar tonsillar herniation. Expected arterial flow-voids are present.     Scattered mucosal thickening in the bilateral frontal sinuses and ethmoidal air  cells without air-fluid level. The mastoid air cells and middle ears are clear. The orbital contents are within normal limits. No significant osseous or scalp  lesions are identified.     IMPRESSION  Stable white matter lesions with an appearance consistent with demyelinating  disease.    A white matter lesion in the left frontal deep white matter demonstrates next  minimal restricted diffusion. No postcontrast enhancement to suggest active demyelination at this time. Neuropsychological Mental Status Exam (NMSE):      Historian: Good  Praxis: No UE apraxia  R/L Orientation: Intact to self and to other  Dress: within normal limits   Weight: within normal limits   Appearance/Hygiene: within normal limits   Gait: Shuffling gait  Assistive Devices: None  Mood: within normal limits   Affect: within normal limits   Comprehension: within normal limits   Thought Process: within normal limits   Expressive Language: within normal limits   Receptive Language: within normal limits   Motor:  No cognitive or motor perseveration  ETOH: Occasionally  Tobacco: Denied  Illicit: Marijuana, which helps with the anxiety, helps slow his brain down. Does not help with memory. SI/HI: Denied  Psychosis: Denied  Insight: Within normal limits  Judgment: Within normal limits  Other Psych:      Past Medical History:   Diagnosis Date    Seizures ([facility] [address]        Past Surgical History:   Procedure Laterality Date    HX ANKLE FRACTURE TX  1994    HX APPENDECTOMY         No Known Allergies    No family history on file. Social History     Tobacco Use    Smoking status: Never Smoker    Smokeless tobacco: Never Used   Substance Use Topics    Alcohol use: Not Currently    Drug use: Never       Current Outpatient Medications   Medication Sig Dispense Refill    lamoTRIgine (LaMICtal) 100 mg tablet       topiramate (TOPAMAX) 25 mg tablet Week 1: 1 tab in PM.  Week 2: 1 tab twice a day. Week 3: 1 tab AM and 2 tabs PM.  Week 4: CHANGE TO 50 mg tablets 45 Tab 0    [START ON 4/6/2021] topiramate (TOPAMAX) 50 mg tablet Take 1 Tab by mouth two (2) times a day. Anti-seizure medication. May help reduce alcohol intake. 60 Tab 0         Plan:  Obtain authorization for testing from [employer].   Report to follow once testing, scoring, and interpretation completed. ? Organic based neurocognitive issues versus mood disorder or combination of same. ? Problems organic, functional, or both? This note will not be viewable in [address]

## 2021-04-20 ENCOUNTER — TELEPHONE (OUTPATIENT)
Dept: NEUROLOGY | Age: 36
End: 2021-04-20

## 2021-04-20 DIAGNOSIS — H53.8 BLURRY VISION: Primary | ICD-10-CM

## 2021-04-20 DIAGNOSIS — G35 MS (MULTIPLE SCLEROSIS) (HCC): ICD-10-CM

## 2021-04-20 NOTE — TELEPHONE ENCOUNTER
patient claims new medication has caused him to loose  partial eyesight in his left eye and eyesight is better in his right eye. Patient needs to know if he should stop taking the medication.   Please call him at [phone] just as soon as possible, he is very concerned

## 2021-04-20 NOTE — TELEPHONE ENCOUNTER
Spoke with patient. Reports that after starting Topamax developed blurry vision. Says he saw an Optometrist (Jeyson) who recommended he see Ophthalmologist (not clear if he has a referral for that). I instructed pt to stop taking the Topamax and change his Lamotrigine 100 mg tablets to 2 in AM and 3 in PM (for seizure hx).     D/w patient that I am referring him to Ophthalmology/ Dr Glenn Deal / [facility]    Will ask Nurse to fax this note and pt's last clinic note to Ophthalmology

## 2021-04-21 NOTE — TELEPHONE ENCOUNTER
Faxed office note, this telephone encounter, and patient's insurance card to Dr. Gurvinder Ko office.

## 2021-04-22 ENCOUNTER — HOSPITAL ENCOUNTER (OUTPATIENT)
Dept: GENERAL RADIOLOGY | Age: 36
Discharge: HOME | End: 2021-04-22
Attending: PSYCHIATRY & NEUROLOGY
Payer: MEDICAID

## 2021-04-22 VITALS
RESPIRATION RATE: 16 BRPM | DIASTOLIC BLOOD PRESSURE: 86 MMHG | SYSTOLIC BLOOD PRESSURE: 119 MMHG | HEART RATE: 59 BPM | OXYGEN SATURATION: 98 %

## 2021-04-22 DIAGNOSIS — R90.82 WHITE MATTER ABNORMALITY ON MRI OF BRAIN: ICD-10-CM

## 2021-04-22 LAB
25(OH)D3 SERPL-MCNC: 22.5 NG/ML (ref 30–100)
APPEARANCE CSF: CLEAR
COLOR CSF: COLORLESS
GLUCOSE CSF-MCNC: 60 MG/DL (ref 40–70)
LYMPHOCYTES NFR CSF MANUAL: 90 % (ref 28–96)
MONOCYTES NFR CSF MANUAL: 5 % (ref 16–56)
NEUTROPHILS NFR CSF MANUAL: 5 % (ref 0–7)
RBC # CSF: 615 /CU MM
TUBE # CSF: 1
TUBE # CSF: 3
WBC # CSF: 9 /CU MM (ref 0–5)

## 2021-04-22 PROCEDURE — 86592 SYPHILIS TEST NON-TREP QUAL: CPT

## 2021-04-22 PROCEDURE — 82945 GLUCOSE OTHER FLUID: CPT

## 2021-04-22 PROCEDURE — 82040 ASSAY OF SERUM ALBUMIN: CPT

## 2021-04-22 PROCEDURE — 86038 ANTINUCLEAR ANTIBODIES: CPT

## 2021-04-22 PROCEDURE — 87205 SMEAR GRAM STAIN: CPT

## 2021-04-22 PROCEDURE — 36415 COLL VENOUS BLD VENIPUNCTURE: CPT

## 2021-04-22 PROCEDURE — 89050 BODY FLUID CELL COUNT: CPT

## 2021-04-22 PROCEDURE — 82164 ANGIOTENSIN I ENZYME TEST: CPT

## 2021-04-22 PROCEDURE — 83921 ORGANIC ACID SINGLE QUANT: CPT

## 2021-04-22 PROCEDURE — 62270 DX LMBR SPI PNXR: CPT

## 2021-04-22 PROCEDURE — 86147 CARDIOLIPIN ANTIBODY EA IG: CPT

## 2021-04-22 PROCEDURE — 86617 LYME DISEASE ANTIBODY: CPT

## 2021-04-22 PROCEDURE — 74011000250 HC RX REV CODE- 250: Performed by: RADIOLOGY

## 2021-04-22 PROCEDURE — 82306 VITAMIN D 25 HYDROXY: CPT

## 2021-04-22 PROCEDURE — 86235 NUCLEAR ANTIGEN ANTIBODY: CPT

## 2021-04-22 RX ORDER — LIDOCAINE HYDROCHLORIDE 10 MG/ML
INJECTION, SOLUTION EPIDURAL; INFILTRATION; INTRACAUDAL; PERINEURAL
Status: DISCONTINUED
Start: 2021-04-22 | End: 2021-04-22 | Stop reason: WASHOUT

## 2021-04-22 RX ORDER — SODIUM BICARBONATE 42 MG/ML
2 INJECTION, SOLUTION INTRAVENOUS
Status: COMPLETED | OUTPATIENT
Start: 2021-04-22 | End: 2021-04-22

## 2021-04-22 RX ADMIN — SODIUM BICARBONATE 1 ML: 42 INJECTION, SOLUTION INTRAVENOUS at 09:11

## 2021-04-22 NOTE — DISCHARGE INSTRUCTIONS
Patient Education   [facility]  Radiology Department  [phone]      Radiologist:  Dr. Keira Harden    Date:  4/22/2021        Lumbar Puncture Discharge Instructions      Go home and rest for the next 24 - 48 hours, rest flat or in a reclined position. Limit your activity, including  standing and walking,  to minimize post procedure complications. Increase your fluid intake over the next 24 to 48 hours, try to minimize the use of caffeine products. This will help your hydrate and help your body replace the CSF fluid that was removed for lab testing. Resume your previous diet and prescribed medications. You make take Tylenol, as directed on the label, for pain or headache. Avoid aspirin or ibuprofen (Motrin or Advil) for the next 24 -  48 hours as it may increase your risk of bleeding. You may shower in 24 hours. Wash area with soap and water. Dry well and keep covered with a band aid. Do not soak or swim until the site is completely healed to minimize the risk of infection. If new, severe headache occurs and does not resolve with the recommended instructions above, call your ordering doctor or seek emergency medical treatment for further evaluation. Follow up with your referring physician as previously discussed. All results will be sent to your ordering physician. Side effects of sedation medications and other medications used today have been reviewed. Notify us of nausea, itching, hives, dizziness, or anything else out of the ordinary. Should you experience any of these significant changes, please call [phone] between the hours of 7:30 am and 10 pm or [phone] after hours.  After hours, ask the  to page the X-ray Technologist, and describe the problem to the technologist.      Lumbar Puncture: After Your Visit  Your Care Instructions  A lumbar puncture (also called a spinal tap) is a test to check the fluid that surrounds and protects your spinal cord and brain. Your doctor may have done this test to look for an infection. In some cases, a lumbar puncture is done to release pressure from too much fluid or to look for diseases such as multiple sclerosis. The fluid that was taken is often sent to a lab for different tests. Your doctor may get some answers right away, but other answers take hours to days. Your doctor will call you with the results. You may feel tired or have a mild backache or a headache after the test. Some people have trouble sleeping for 1 or 2 days. Follow-up care is a key part of your treatment and safety. Be sure to make and go to all appointments, and call your doctor if you are having problems. Its also a good idea to know your test results and keep a list of the medicines you take. How can you care for yourself at home? · Drink plenty of liquids in the next few hours. This may prevent a headache or keep a headache from being severe. · Your doctor may tell you to lie flat in bed for 1 to 4 hours. This may prevent a headache. · Get plenty of rest.  · If your doctor prescribed antibiotics, take them as directed. Do not stop taking them just because you feel better. You need to take the full course of antibiotics. · Take anti-inflammatory medicines to reduce a headache or backache. These include ibuprofen (Advil, Motrin) and naproxen (Aleve). Read and follow all instructions on the label. When should you call for help? Call your doctor now or seek immediate medical care if:  · You have a fever with a stiff neck or a severe headache. · You have any drainage or bleeding from the site of the puncture. · You feel numb or lose strength below the puncture site. Watch closely for changes in your health, and be sure to contact your doctor if:  · You do not get better as expected. Where can you learn more?    Go to [place]  Enter M783 in the search box to learn more about \"Lumbar Puncture: After Your Visit. \"   © 5597-8842 Healthwise, Incorporated. Care instructions adapted under license by [facility] (which disclaims liability or warranty for this information). This care instruction is for use with your licensed healthcare professional. If you have questions about a medical condition or this instruction, always ask your healthcare professional. [place] [address] any warranty or liability for your use of this information.   Content Version: 2.2.621161; Last Revised: September 13, 2011

## 2021-04-22 NOTE — PROGRESS NOTES
Pt arrives via strether to angio department accompanied by self post LP procedure. MS panel drawn and all specimens taken to the lab by IR ede.

## 2021-04-22 NOTE — PROGRESS NOTES
Discharge instructions reviewed with patient with good understanding. Patient signed hard copy of discharge instructions and copy of discharge instructions given to patient upon leaving. Patient taken to car via wheelchair with tech at side.

## 2021-04-23 DIAGNOSIS — E55.9 VITAMIN D DEFICIENCY: Primary | ICD-10-CM

## 2021-04-23 LAB
ANA SER QL: NEGATIVE
ANGIO CONVERTING ENZ, CSF: <1.5 U/L (ref 0–2.5)
CARDIOLIPIN IGA SER IA-ACNC: <9 APL U/ML (ref 0–11)
CARDIOLIPIN IGG SER IA-ACNC: <9 GPL U/ML (ref 0–14)
CARDIOLIPIN IGM SER IA-ACNC: <9 MPL U/ML (ref 0–12)
ENA SS-A AB SER-ACNC: <0.2 AI (ref 0–0.9)
ENA SS-B AB SER-ACNC: <0.2 AI (ref 0–0.9)
REAGIN AB CSF QL: NON REACTIVE

## 2021-04-23 RX ORDER — ERGOCALCIFEROL 1.25 MG/1
50000 CAPSULE ORAL
Qty: 12 CAP | Refills: 1 | Status: SHIPPED | OUTPATIENT
Start: 2021-04-23 | End: 2022-04-04

## 2021-04-27 LAB
ALB CSF/SERPL: NORMAL {RATIO}
ALBUMIN CSF-MCNC: 35 MG/DL (ref 11–48)
ALBUMIN SERPL-MCNC: NORMAL G/DL
IGG CSF-MCNC: 7.3 MG/DL (ref 0–8.6)
IGG SERPL-MCNC: 869 MG/DL (ref 603–1613)
IGG SYNTH RATE SER+CSF CALC-MRATE: NORMAL MG/DAY
IGG/ALB CLEAR SER+CSF-RTO: NORMAL
IGG/ALB CSF: 0.21 {RATIO} (ref 0–0.25)
Lab: NORMAL
METHYLMALONATE SERPL-SCNC: 134 NMOL/L (ref 0–378)
OLIGOCLONAL BANDS.IT SER+CSF QL: NORMAL

## 2021-04-29 LAB
B BURGDOR IGG PATRN CSF IB-IMP: NEGATIVE
B BURGDOR IGM PATRN CSF IB-IMP: NEGATIVE
B BURGDOR18KD IGG CSF QL IB: ABNORMAL
B BURGDOR23KD IGG CSF QL IB: ABNORMAL
B BURGDOR23KD IGM CSF QL IB: ABNORMAL
B BURGDOR28KD IGG CSF QL IB: ABNORMAL
B BURGDOR30KD IGG CSF QL IB: ABNORMAL
B BURGDOR39KD IGG CSF QL IB: ABNORMAL
B BURGDOR39KD IGM CSF QL IB: ABNORMAL
B BURGDOR41KD IGG CSF QL IB: PRESENT
B BURGDOR41KD IGM CSF QL IB: ABNORMAL
B BURGDOR45KD IGG CSF QL IB: ABNORMAL
B BURGDOR58KD IGG CSF QL IB: ABNORMAL
B BURGDOR66KD IGG CSF QL IB: ABNORMAL
B BURGDOR93KD IGG CSF QL IB: ABNORMAL
BACTERIA SPEC CULT: NORMAL
BACTERIA SPEC CULT: NORMAL
GRAM STN SPEC: NORMAL
GRAM STN SPEC: NORMAL
SERVICE CMNT-IMP: NORMAL

## 2021-06-16 ENCOUNTER — OFFICE VISIT (OUTPATIENT)
Dept: NEUROLOGY | Age: 36
End: 2021-06-16
Payer: MEDICAID

## 2021-06-16 ENCOUNTER — TELEPHONE (OUTPATIENT)
Dept: NEUROLOGY | Age: 36
End: 2021-06-16

## 2021-06-16 VITALS
BODY MASS INDEX: 24.96 KG/M2 | DIASTOLIC BLOOD PRESSURE: 76 MMHG | HEIGHT: 74 IN | SYSTOLIC BLOOD PRESSURE: 108 MMHG | WEIGHT: 194.5 LBS | OXYGEN SATURATION: 97 % | HEART RATE: 68 BPM

## 2021-06-16 DIAGNOSIS — Z86.69 HX OF SEIZURE DISORDER: ICD-10-CM

## 2021-06-16 DIAGNOSIS — R79.89 LOW VITAMIN D LEVEL: ICD-10-CM

## 2021-06-16 DIAGNOSIS — F09 COGNITIVE DISORDER: ICD-10-CM

## 2021-06-16 DIAGNOSIS — G35 MS (MULTIPLE SCLEROSIS) (HCC): Primary | ICD-10-CM

## 2021-06-16 PROCEDURE — 99215 OFFICE O/P EST HI 40 MIN: CPT | Performed by: PSYCHIATRY & NEUROLOGY

## 2021-06-16 RX ORDER — LATANOPROST 50 UG/ML
1 SOLUTION/ DROPS OPHTHALMIC
COMMUNITY

## 2021-06-16 RX ORDER — TOPIRAMATE 50 MG/1
50 TABLET, FILM COATED ORAL 2 TIMES DAILY
COMMUNITY
End: 2021-06-25

## 2021-06-16 NOTE — PATIENT INSTRUCTIONS
Continue taking Topamax 100 mg twice a day Taper off the Lamotrigne the following way Week 1: 2 tabs in morning and 2 tabs in evening Week 2: 2 tabs in morning and 1 tab in evening Week 3: 1 tab in morning and 1 tab in evenign Week 4: 1 tab in morning Then stop taking Lamotrigine Starting you on Gilenya, a once daily capsule to prevent Multiple Sclerosis from progressing/ causing you additional symptoms. You signed a start form for this medication and the company will be contacting you to set up the First Dose Observation. Call the Ophthalmology Clinic you were seen at and ask them to fax the clinic note to Dr Thornton/ Neurology at (fax #) [phone] Follow up in Neurology clinic in 3 months

## 2021-06-16 NOTE — PROGRESS NOTES
Dwight Olmos (1985) is a 38 y.o. male, established patient, here for evaluation of the following     Chief complaint(s):   Chief Complaint   Patient presents with    Multiple Sclerosis     followup       SUBJECTIVE/ OBJECTIVE:    HPI: 38 y.o. male      1) Multiple Sclerosis (See brief hx below)    Lumbar puncture 4/22/2021: CSF ACE less than 1.5, Lyme antibody panel 1 IgG positive all other IgG and IgM negative (does not reach criteria for a positive test), Gram stain culture negative, VDRL negative, glucose 60, cell count 9 WBC/ 615 RBC, MS panel: 9 oligoclonal bands    Labs 4/22/2021: Vitamin D 22.5 (low), SSA and SSB antibodies are negative (less than 0.2), methylmalonic acid 134, cardiolipin antibody panel negative, LETI negative. 2) Hx of Seizure disorder:   No seizure that he can recalls since 2016-7. Remains on Lamotrigine 100 mg, 3 tabs in AM and 2 tabs in PM.  Lamictal level checked on 2-1-2021 was 12.1 within therapeutic range (2 - 20). Added Topamax 100 mg BID at last visit, with plan to get him off Lamotrigine, for the possibility it was contributing to his cognitive difficulty. He called back reporting some blurry vision after starting the Topamax. I advised him to stop the Topamax restart his lamotrigine and gave him a referral to ophthalmology. He says he saw the ophthalmologist they checked his eyes they put him on Lantus processed talked about possibly some mild increased pressure (i.e. glaucoma). He says that ophthalmologist told him the Topamax should have nothing to do with the visual changes so he restarted the Topamax, now back on 100 mg twice a day. 3) Chronic cognitive difficulty (? MS vs side effect from high-dose lamotrigine vs due to chronic daily marijuana use):    Normal CBC, B12, Folate, TSH, Lamictal level. CMP normal except minimally elevated sodium 145. Has appt scheduled with Dr Suresh Pabon on 3- for Neuropsych testing.      4) Low vitamin D level (22.5, April 2021)  Sent Rx for Vit D, 50k units once every 7 days  Pt told nurse he wasn't taking     Review of Systems: as above    ========================================    Brief Hx:     See initial visit 10- for full hx. Pt initial visit was to establish care for Seizure disorder. Had relocated from [place] 1 year before that visit, established care with PCP/ Dr Dustin Lopez via [facility] in Sept 2020, reported to her he had hx of \"grand mal seizure,\" needed refill of his Lamotrigine rx, did not have a local neurologist, and requested a \"medical marijuana card\" for [place]. Pt had NO records from any prior providers/ Neurologist included. [facility] [place] [place] where patient had been admitted for two generalized seizures (one at home, witnessed by wife/ now ex-wife, one in ER). MRI Brain done during that admission described multiple white matter lesions, no acute lesions. He was seen by Neurologist / Dr Rosa Squires during that admission, EEG didn't show any epileptiform discharges, not started on AED as seizure was believed to be due to alcohol withdrawal.  He was to follow up in outpatient Neurology clinic but pt can't recall if he did, or what discussions were had about the MRI findings. 1) White matter lesions on Brain MRI without contrast (2-1-2021): Moderate amount of scattered  periventricular > subcortical T2/ FLAIR white matter lesions in a pattern strongly suggestive of demyelinating disease. Patient has hx of seizure disorder (started when he was 31 yo, while living in [place], [place]), is on high-dose Lamotrigine (three 100 mg tablets two times a day), and has chronic cognitive difficulty, says can't remember to most questions. When asked if he was diagnosed with MS in the past, he says can't remember.   On questioning, he recalls that in his early 25s, while living in [place], he had an episode of left sided body numbness, admitted to a hospital somewhere in [place] (he has no records within the [facility] system  prior to his visit with me in Oct 2020), not sure if they did Brain MRI, not sure what was done/ outcome. Denies any episode of vision loss. Reports chronic, unsteady gait over 10 years. Falls easily. Reports right leg down to last two toes feels numb x 2 weeks. MRI Brain with and without contrast (3-2-2021): Stable white matter lesions with an appearance consistent with demyelinating disease. A white matter lesion in the left frontal deep white matter demonstrates next minimal restricted diffusion. No postcontrast enhancement to suggest active demyelination at this time. MRI Cervical spine with and without contrast (3-2-2021): There are scattered abnormal foci of increased T2 signal intensity in the cervical cord in the brainstem at the pontomedullary. Lesion at C2 measures approximately 15 mm in craniocaudal dimension lesion at C3 approximately 11 mm in craniocaudal dimension C6-7 lesion approximately 11 mm in craniocaudal dimension. No postcontrast enhancement to suggest active demyelination. No significant disc degeneration, no disc herniation, no spinal or foraminal stenosis, no facet arthropathy. MRI Thoracic spine with and without (3-2-2021): Scattered foci of abnormal increased cord signal intensity. Minimal cord thinning at the T7-T8, T11-T12 level. Imaging findings consistent with demyelinating disease. No postcontrast enhancement to suggest active demyelination. Multilevel degenerative change without significant canal compromise. 2) Hx of Seizure disorder: Continues taking Lamotrigine 100 mg, three tabs two times a day. Denies skipping or missing any doses recently. Says last seizure was 4-5 years ago. EEG done on 10-: normal awake, drowsy, brief sleep EEG.     3) Chronic cognitive difficulty (? MS vs side effect from lamictal/ high-dose lamictal vs due to chronic daily marijuana use): Normal CBC, B12, Folate, TSH, Lamictal level. CMP normal except minimally elevated sodium 145. Has appt scheduled with Dr Lindsay Cain on 3- for Neuropsych testing. No Known Allergies    Current Outpatient Medications   Medication Sig Dispense Refill    latanoprost (XALATAN) 0.005 % ophthalmic solution Administer 1 Drop to both eyes nightly.  topiramate (TOPAMAX) 50 mg tablet Take 50 mg by mouth two (2) times a day.  lamoTRIgine (LaMICtal) 100 mg tablet Take 3 tablets in morning and 2 tablets in evening. Anti-seizure medication. 150 Tab 1    ergocalciferol (ERGOCALCIFEROL) 1,250 mcg (50,000 unit) capsule Take 1 Cap by mouth every seven (7) days for 90 days. Indications: prevention of vitamin D deficiency (Patient not taking: Reported on 6/16/2021) 12 Cap 1       Past Medical History:   Diagnosis Date    Seizures ([facility] [address]         has a past surgical history that includes hx appendectomy and hx ankle fracture tx (1994). Physical Exam:    Vitals:    06/16/21 1336   BP: 108/76   Pulse: 68   Height: 6' 2\" (1.88 m)   Weight: 88.2 kg (194 lb 8 oz)   SpO2: 97%       Awake, resting, calm, conversant. EOMI. Visual fields normal.  Face is symmetric. Hearing and speech are normal.  Appears to have cognitive difficulty as he cannot give me clear answers to different questions. Moves all extremities 5 out of 5. Intact light touch in all extremities. Gait is normal.    ========================================    ASSESSMENT/ PLAN:       ICD-10-CM ICD-9-CM    1. MS (multiple sclerosis) ([address]  G35 340    2. Cognitive disorder  F09 294.9    3. Hx of seizure disorder  Z86.69 V12.49    4. Low vitamin D level  R79.89 790.6         1) Multiple Sclerosis (based on imaging and LP)  Discussed starting an MS Medication to reduce disease progression. Patient is interested in doing so. Will start him on Reta pt filled out start form.       2) Hx of Seizure (may be MS related):  Continue Topamax 100 mg twice a day.  Gave instructions on how to taper off lamotrigine. 3) Chronic cognitive difficulty (multi-factorial: MS, long-term daily marijuana use, ? Medication side effect, ? Underlying psychiatric illness, hx of heavy alcohol use in the past). Advised pt to completely stop alcohol intake (he reports having 2-3 drinks 3 days a week). See Dr Nagi Carney on 6- for neuropsych testing/ memory testing    4) Low Vitamin D  Pt not taking prescribed Vit D supplement    Pt to let nurse know which Ophthalmology Clinic he was seen at so we can request the clinic note    Follow up in 3 months      An electronic signature was used to authenticate this note.   -- Maggie Noble MD

## 2021-06-17 NOTE — TELEPHONE ENCOUNTER
Left another message for Jordyn Duke @ [facility] [address] to please call back with fax number. Needed for records release.

## 2021-06-17 NOTE — TELEPHONE ENCOUNTER
Miya Fischer took message: fax number for [address] JULIAN  Maurice Dillon [phone]. Records release faxed to  [facility].

## 2021-06-22 ENCOUNTER — TELEPHONE (OUTPATIENT)
Dept: NEUROLOGY | Age: 36
End: 2021-06-22

## 2021-06-23 ENCOUNTER — TELEPHONE (OUTPATIENT)
Dept: NEUROLOGY | Age: 36
End: 2021-06-23

## 2021-06-23 DIAGNOSIS — G40.909 SEIZURE DISORDER (HCC): ICD-10-CM

## 2021-06-23 NOTE — TELEPHONE ENCOUNTER
Fax from [address]: Gilenya needs PA.   Please call [phone] for PA

## 2021-06-24 ENCOUNTER — OFFICE VISIT (OUTPATIENT)
Dept: NEUROLOGY | Age: 36
End: 2021-06-24
Payer: MEDICAID

## 2021-06-24 DIAGNOSIS — F43.10 PTSD (POST-TRAUMATIC STRESS DISORDER): ICD-10-CM

## 2021-06-24 DIAGNOSIS — F02.80 MAJOR NEUROCOGNITIVE DISORDER DUE TO MULTIPLE ETIOLOGIES (HCC): Primary | ICD-10-CM

## 2021-06-24 DIAGNOSIS — F45.0 SOMATIZATION DISORDER: ICD-10-CM

## 2021-06-24 DIAGNOSIS — F32.2 SEVERE MAJOR DEPRESSION (HCC): ICD-10-CM

## 2021-06-24 DIAGNOSIS — G35 MS (MULTIPLE SCLEROSIS) (HCC): ICD-10-CM

## 2021-06-24 DIAGNOSIS — F44.9 CONVERSION DISORDER: ICD-10-CM

## 2021-06-24 DIAGNOSIS — Z86.69 HX OF SEIZURE DISORDER: ICD-10-CM

## 2021-06-24 DIAGNOSIS — F41.9 SEVERE ANXIETY: ICD-10-CM

## 2021-06-24 PROCEDURE — 96133 NRPSYC TST EVAL PHYS/QHP EA: CPT | Performed by: CLINICAL NEUROPSYCHOLOGIST

## 2021-06-24 PROCEDURE — 96137 PSYCL/NRPSYC TST PHY/QHP EA: CPT | Performed by: CLINICAL NEUROPSYCHOLOGIST

## 2021-06-24 PROCEDURE — 96132 NRPSYC TST EVAL PHYS/QHP 1ST: CPT | Performed by: CLINICAL NEUROPSYCHOLOGIST

## 2021-06-24 PROCEDURE — 96138 PSYCL/NRPSYC TECH 1ST: CPT | Performed by: CLINICAL NEUROPSYCHOLOGIST

## 2021-06-24 PROCEDURE — 96139 PSYCL/NRPSYC TST TECH EA: CPT | Performed by: CLINICAL NEUROPSYCHOLOGIST

## 2021-06-24 PROCEDURE — 96136 PSYCL/NRPSYC TST PHY/QHP 1ST: CPT | Performed by: CLINICAL NEUROPSYCHOLOGIST

## 2021-06-24 NOTE — LETTER
6/28/2021    Patient: Maximo Holland   YOB: 1985   Date of Visit: 6/24/2021     Lida Conrad MD  [address] [address]  Via In Basket    Dear Lida Conrad MD,      Thank you for referring Mr. Jaz Abarca to [facility] for evaluation. My notes for this consultation are attached. If you have questions, please do not hesitate to call me. I look forward to following your patient along with you.       Sincerely,    Skippy Clonts, PsyD

## 2021-06-25 RX ORDER — LAMOTRIGINE 100 MG/1
TABLET ORAL
Qty: 150 TABLET | Refills: 1 | Status: SHIPPED | OUTPATIENT
Start: 2021-06-25 | End: 2021-10-21

## 2021-06-25 NOTE — TELEPHONE ENCOUNTER
----- Message from Darin Mercedes sent at 6/25/2021 10:05 AM EDT -----  Regarding: /telephone  General Message/Vendor Calls    Caller's first and last name: n/a      Reason for call: Medication concerns      Callback required yes/no and why: Yes      Best contact number(s):[phone]      Details to clarify the request: Pt would a call to discuss the change in his medication. He is concerned about stopping completely with the \"lamotrigine\". Pt would also like details on the new medication he suppose to be starting, he has not received anything yet.        Darin Mercedes

## 2021-06-25 NOTE — TELEPHONE ENCOUNTER
Left message on patient's cell # that Dr Stephane Aquino really wants him to come off the lamotrigine because he thinks it is contributing to his drowsiness. He does want him to stay on the topamax 100 mg BID since the ophthalmologist said it was OK. Also, sent patient an information packet on the Gilenya, but it will take a few days to get to him through the mail.  Call back information left if he has any further questions

## 2021-06-25 NOTE — TELEPHONE ENCOUNTER
LVM on patient cell# (has name of patient) that Dr Herman Martinez did not want him to continue the lamotrigine, but does want him to continue the topiramate. Call back if any questions. Left contact information.

## 2021-06-25 NOTE — TELEPHONE ENCOUNTER
Spoke with patient: he is very concerned with going \"cold turkey\" off the lamotrigine. He reduced to 5/ day after the office visit and yesterday went to 4/day and now is out of medication for the rest of the taper. He also states an antidepressant was discussed between him and Dr Bhumi Mays, but that is lower on the scale of importance right now. Please handle the lamotrigine issue today.

## 2021-06-28 NOTE — PROGRESS NOTES
[address] [facility],5Th Floor  [address]   [address] [place] [address] [facility]   [address]   [phone] Office   [phone] Fax      Neuropsychological Evaluation Report  Referral:  Linda Laboy MD, Dr. Garcia Blank is a 38 y.o. right handed partnered   male who was unaccompanied to the initial clinical interview on 3/25/21 . Please refer to his medical records for details pertaining to his history. At the start of the appointment, I reviewed the patient's [facility] Epic Chart (including Media scanned in from previous providers) for the active Problem List, all pertinent Past Medical Hx, medications, recent radiologic and laboratory findings. In addition, I reviewed pt's documented Immunization Record and Encounter History. He completed college without history of previously diagnosed LD and/or receipt of special education services. He lives with his partner. The patient has a history of seizures diagnosed 10 years ago and recently diagnosed with MS. He has been having significant problems with short term memory for about ten years and progressive to a little degree, especially over the last year or so. Forgets the content of conversations. Misplaces things. Partner notices and makes fun of him some about it. Forgets if he has taken his medications. Conversations. Misplaces things. Has to write everything down. More easily distracted. Biggest thing is forgetting his meds. He has seen Neuro in [place] and is followed by Dr. Lavell Monique here. He moved to [facility] in 2018. Last known seizure was 5 years ago, and he has had maybe 20 known seizures. He does not drink a lot of alcohol. He is on Lamotrigine 100 mg 3 tablets twice a day and being switched. See notes. His brain will jump from one thing to another. Sleep is fine. Appetite is fine. He has definitely had some depression/anxiety, in particular with this situation.   He had some mild attention problems in school, never diagnosed. Has anxiety about all of this. Mood swings at times. Independent for medications, finances, day-to-day chores, ADLs, etc.  He does have to write everything down, though, else he forgets. He is driving. FHx epilepsy: no  Brain injury: no  Hx of concussion: no  Hx of meningitis or brain infection: no      EXAM:  MRI BRAIN W WO CONT  History: Multiple sclerosis  INDICATION:    Evaluating for MS     COMPARISON:  2/1/2021.     CONTRAST: 18 ml Dotarem.     TECHNIQUE:    Multiplanar multisequence acquisition without and with contrast of the brain.     FINDINGS:  FINDINGS:  There are multiple T2/FLAIR hyperintense lesions in the periventricular and deep  white matter of both cerebral hemispheres, with involvement of the callosal  septal interface and many oriented perpendicular to the ventricles. There is a  T2/FLAIR hyperintense lesion in the deep white matter of the left frontal lobe  measuring 1.3 x 1.2 cm which demonstrates mild restricted diffusion 6-22 less  conspicuous than prior exam. There is no postcontrast enhancement to suggest  active demyelination. The ventricles are normal in size and position. There is no acute infarct,  hemorrhage, extra-axial fluid collection, or mass effect. There is a linear  focus of susceptibility hypointensity in the right frontoparietal deep white  matter, which may represent a small developmental venous anomaly. There is no  cerebellar tonsillar herniation. Expected arterial flow-voids are present.     Scattered mucosal thickening in the bilateral frontal sinuses and ethmoidal air  cells without air-fluid level. The mastoid air cells and middle ears are clear. The orbital contents are within normal limits. No significant osseous or scalp  lesions are identified.     IMPRESSION  Stable white matter lesions with an appearance consistent with demyelinating  disease.    A white matter lesion in the left frontal deep white matter demonstrates next  minimal restricted diffusion. No postcontrast enhancement to suggest active demyelination at this time. Neuropsychological Mental Status Exam (NMSE):      Historian: Good  Praxis: No UE apraxia  R/L Orientation: Intact to self and to other  Dress: within normal limits   Weight: within normal limits   Appearance/Hygiene: within normal limits   Gait: Shuffling gait  Assistive Devices: None  Mood: within normal limits   Affect: within normal limits   Comprehension: within normal limits   Thought Process: within normal limits   Expressive Language: within normal limits   Receptive Language: within normal limits   Motor:  No cognitive or motor perseveration  ETOH: Occasionally  Tobacco: Denied  Illicit: Marijuana, which helps with the anxiety, helps slow his brain down. Does not help with memory. SI/HI: Denied  Psychosis: Denied  Insight: Within normal limits  Judgment: Within normal limits  Other Psych:      Past Medical History:   Diagnosis Date    Seizures ([facility] [address]        Past Surgical History:   Procedure Laterality Date    HX ANKLE FRACTURE TX  1994    HX APPENDECTOMY         No Known Allergies    No family history on file. Social History     Tobacco Use    Smoking status: Never Smoker    Smokeless tobacco: Never Used   Substance Use Topics    Alcohol use: Not Currently    Drug use: Never       Current Outpatient Medications   Medication Sig Dispense Refill    lamoTRIgine (LaMICtal) 100 mg tablet Take 3 tablets in morning and 2 tablets in evening. Anti-seizure medication. 150 Tablet 1    topiramate (TOPAMAX) 50 mg tablet Take 2 Tablets by mouth two (2) times a day for 90 days. Anti-seizure medication 360 Tablet 1    latanoprost (XALATAN) 0.005 % ophthalmic solution Administer 1 Drop to both eyes nightly.  ergocalciferol (ERGOCALCIFEROL) 1,250 mcg (50,000 unit) capsule Take 1 Cap by mouth every seven (7) days for 90 days.  Indications: prevention of vitamin D deficiency (Patient not taking: Reported on 6/16/2021) 12 Cap 1         Plan:  Obtain authorization for testing from [facility] [employer] [facility]. Report to follow once testing, scoring, and interpretation completed. ? Organic based neurocognitive issues versus mood disorder or combination of same. ? Problems organic, functional, or both? This note will not be viewable in [address] Neuropsychological Test Results  Patient Testing 6/24/21 Report Completed 6/28/21  A Psychometrist Assisted w/ portions of this evaluation while under my direct supervision    The following assessment procedures were performed:      Neuropsychologist Performed, Interpreted, & Reported: Neuropsychological Mental Status Exam, Revised Memory & Behavior Checklist, Mini Mental State Exam, Clock Drawing Test, Test Of Premorbid Functioning, Jerald-Melzack Pain Questionnaire,  History Taking  & Clinical Interview With The Patient, CPT-III, KASIE, Review Of Available Records. Psychometrist Administered & Neuropsychologist Interpreted & Neuropsychologist Reported: Finger Tapping Test, Grooved Pegboard Test, Speech-Sounds Perception Test,  Wechsler Adult Intelligence Scale  IV, Verbal Fluency Tests, Shiraz & Shiraz  Revised, Trailmaking Test Parts A & B, [place] Verbal Learning Test  3, Dheeraj Complex Figure Test, Glynn Depression Inventory  II, Glynn Anxiety Inventory. Test Findings:  Test Findings:  Note:  The patients raw data have been compared with currently available norms which include demographic corrections for age, gender, and/or education. Sometimes, the patients scores are compared to demographically similar individuals as close to the patients age, education level, etc., as possible. \"Average\" is viewed as being +/- 1 standard deviation (SD) from the stated mean for a particular test score. \"Low average\" is viewed as being between 1 and 2 SD below the mean, and above average is viewed as being 1 and 2 SD above the mean. Scores falling in the borderline range (between 1-1/2 and 2 SD below the mean) are viewed with particular attention as to whether they are normal or abnormal neurocognitive test scores. Other methods of inference in analyzing the test data are also utilized, including the pattern and range of scores in the profile, bilateral motor functions, and the presence, if any, of pathognomonic signs. Behaviorally, the patient was friendly and cooperative and appeared motivated to perform well during this examination. Within this context, the results of this evaluation are viewed as a valid reflection of the patients actual neurocognitive and emotional status. His structured word list fluency, as assessed by the FAS Test, was within the moderately impaired range with a T score of 26. Category fluency was within the mildly impaired range with a T score of 38. Confrontation naming, as assessed by the Shiraz & Shiraz - Revised, was within the moderately impaired range with a T score of 26. This pattern of performance is indicative of a patient who is at increased risk for day-to-day problems with verbal fluency and confrontation naming. The patient was administered the [facility] Continuous Performance Test  III, a computer-administered test of sustained attention, and review of the subscales within this instrument revealed severe concerns for inattentiveness with moderate impulsivity. Verbal auditory attention and discrimination, as assessed by the Speech-Sounds Perception Test (T = 39) was within the mildly impaired range. Nonverbal auditory attention, as assessed by the LUL, was moderately impaired. This pattern of performance is indicative of a patient who is at increased risk for day-to-day problems with sustained visual attention/concentration and verbal auditory attention and nonverbal auditory attention. The patient was administered the Wechsler Adult Intelligence Scale  IV. There was no clinically significant difference between his low average range Working Memory Index score of 80 (9th %ile) and his low average range Processing Speed Index score of 84 (14h %ile). This pattern of performance is not indicative of a patient who is at increased risk for day-to-day problems with working memory and processing speed. His Verbal Comprehension Index score of 85 (16th %ile) was low average. His Perceptual Reasoning Index score of 79 (8th %ile) was borderline, and this latter score is statistically weaker than would be expected based on his performance on a measure assessing premorbid functioning levels. IQ scores are within the borderline to low average range of functioning. The patient was administered the [place] Verbal Learning Test  - 3 and generated an impaired range (but atypically positive and the negative) learning curve over 5 repeated auditory word list learning trials. Some of his raw scores are as follows:  Trial 1: 5  Trial 2: 4  Trial 3: 7  Trial 4: 10  Trial 5: 8    An interference trial was normal (50th percentile). Recall for the original word list was within the severely impaired range after both short (2nd percentile) and long delays (2nd percentile). Cues did not help. Recognition recall was severely impaired (2nd percentile). Forced choice recall was normal (16/16 correct) suggesting good effort. This pattern of performance is suggestive of a patient who is at increased risk for day-to-day problems with auditory learning and memory. The patients performance on the copy portion of the Dheeraj Complex Figure Test was severely impaired (<1st percentile). His score did not improve in terms of immediate or delayed recall, as both of those cores were also severely impaired. Recognition recall was borderline (T = 38). This is suggestive of a patient who is at increased risk for day-to-day problems with visual organization and visual memory.      Simple timed visual motor sequencing (Trailmaking Test Part A) was within the average range with a T score of 51. His performance on a similar, but more complex task of timed visual motor sequencing (Trailmaking Test Part B) was within the mildly to moderately impaired range with a T score of 26. He made only one sequencing error on this latter completed test.  Taken together, this pattern of performance is indicative of a patient who is at increased risk for day-to-day problems with executive functioning.  strength was mildly impaired bilaterally. Fine motor dexterity was moderately to severely impaired bilaterally. Neurologic correlation is indicated. The patient rated his current level of pain as \"1/5-Mild\" on the Jerald-Melzack Pain Questionnaire. He reported pain in his right lower leg. He reported numbness, dizziness, drowsiness along with nausea, dizziness and limited food intake. His Glynn Depression Inventory II score of 20 was within the moderately depressed range. His Glynn Anxiety Inventory score of 39 reflected severe anxiety. .  The patient was administered the Personality Assessment Inventory and generated a valid profile for help, though there is a \"cry for help\" pattern of responding. Within this context, the patient sees his life is severely distracted by variety of physical problems. There is very strong support for both conversion and somatization disorders. Severe depression is present. He is plagued by thoughts of worthlessness, hopelessness, and personal failure. He is likely to be a socially isolated individual who has limited social skills, with particular difficulties interpreting the normal nuances of interpersonal behavior the provide meaning to relationships. Numerous maladaptive behavior patterns aimed at controlling anxiety are present. There is very strong support for PTSD.   Anxiety is severe to the degree whereby his ability to focus and to attend or concentrate are compromised, and somatic symptoms of anxiety are also present. He is quite vázquez and emotionally labile, with mood swings being rapid and rather extreme. He may have episodes of poorly controlled anger, and his use of alcohol may have been problematic for him. Self-concept is quite harsh and negative. Interpersonally, he is withdrawn and introverted. The current level of distress also appears to be related to some situational stress as the relatively intact social support system is a favorable prognostic sign for future adjustment. He reports a high level of treatment motivation. Impressions & Recommendations: This patient generated an abnormal range Neuropsychological Evaluation with respect to neurocognitive functioning. In this regard, impairments are noted across the vast majority of neurocognitive domains assessed. This includes verbal fluency, confrontation naming, sustained visual attention, verbal auditory attention, nonverbal auditory attention, perceptual reasoning, auditory learning, auditory memory, visual organization, visual memory, bilateral fine motor skills. His executive functioning abilities are slow, but normal.  From an emotional standpoint, there is severe depression, severe anxiety, somatization disorder, conversion disorder, and PTSD. Mood issues are significant to the degree whereby his ability to focus and to attend are further compromised. He reports that he has cut down on his alcohol consumption but continues to use marijuana frequently, which \"slows his brain down\". The patient is not demonstrating effort problems on this test profile and there is no evidence which would suggest a malingering type issue. In addition, there is a evidence suggestive of an organic based neurocognitive disorder here. At the same time, there is significant psychiatric/functional interference here.   If his cognitive test profile actually represented his current day-to-day functional capacity, the patient would have moderate to severe Alzheimer's disease be living in assisted living facility receiving 24/7 care and supervision. While the patient does indeed have organic based neurocognitive deficits, they are certainly not to the degree that these test scores would imply. This suggests marked psychiatric interference/exacerbation. Thus, I recommend a stepwise approach to treatment. First, I recommend a review of his psychiatric medication management for severe depression and anxiety along with active engagement in intensive psychotherapy. Consider EMDR. Also recommend consideration for an appropriate medication for attention if this is not medically contraindicated. Consult with OT and speech. Should cognitive problems persist pending successful resolution of mood issues and attention problems, then a follow-up neuropsychological evaluation with then be indicated to better clarify whether or not he is showing an atypical dementia type issue. I am not currently concerned about competency/capacity but he does need supervision for medications, finances, day-to-day household safety, and nutritional/meal preparation. Consider in-home health to assist.  I find him disabled at this time. Again, no evidence of malingering here. This is a combination of organic and functional issues. Without serial testing, I am stuck with 1 data point in time and thus cannot track her project with the future looks like for him. Serial testing will help clarify this as well. We now have extensive baseline neurocognitive and psychological data on him. Follow-up in 6 months. Clinical correlation is, course, indicated. I will discuss these findings with the patient when he follows up with me in the near future. A follow up Neuropsychological Evaluation is indicated on a prn basis, especially if there are any cognitive and/or emotional changes.       DIAGNOSES:  Major Neurocognitive Disorder,  Etiology Unclear     Major Depression - Severe     Anxiety Disorder - Severe     Somatization and Conversion     PTSD     The above information is based upon information currently available to me. If there is any additional information of which I am currently unaware, I would be more than happy to review it upon having it made available to me. Thank you for the opportunity to see this interesting individual.     Sincerely,       Joss Dixon. Marlys Manning, EdS        dd  CC:  Rola Talbert MD, Dr. Key Almaguer      Time Documentation:      25035 x1 &  42900 x 1 Neuropsych testing/data gathering by Neuropsychologist (60 minutes)     [phone] x 1  97582 x 7 Test Administration/Data Gathering By Technician: (4 hours). 97879 x 1 (first 30 minutes), 13977 x 7 (each additional 30 minutes)    96132 x 1  96133 x 1 Testing Evaluation Services by Neuropsychologist (1 hour, 50 minutes) 96132 x 1 (first hour), 96133 x 1 (50 minutes)    Definitions:      02526/06422:  Neurobehavioral Status Exam, Clinical interview. Clinical assessment of thinking, reasoning and judgment, by neuropsychologist, both face to face time with patient and time interpreting those test results and reporting, first and subsequent hours)    86627/76766: Neuropsychological Test Administration by Technician/Psychometrist, first 30 minutes and each additional 30 minutes. The above includes: Record review. Review of history provided by patient. Review of collaborative information. Testing by Clinician. Review of raw data. Scoring. Report writing of individual tests administered by Clinician. Integration of individual tests administered by psychometrist with NSE/testing by clinician, review of records/history/collaborative information, case Conceptualization, treatment planning, clinical decision making, report writing, coordination Of Care.  Psychometry test codes as time spent by psychometrist administering and scoring neurocognitive/psychological tests under supervision of neuropsychologist.  Integral services including scoring of raw data, data interpretation, case conceptualization, report writing etcetera were initiated after the patient finished testing/raw data collected and was completed on the date the report was signed.

## 2021-07-21 ENCOUNTER — OFFICE VISIT (OUTPATIENT)
Dept: NEUROLOGY | Age: 36
End: 2021-07-21
Payer: MEDICAID

## 2021-07-21 VITALS
WEIGHT: 187.5 LBS | HEART RATE: 85 BPM | BODY MASS INDEX: 24.06 KG/M2 | SYSTOLIC BLOOD PRESSURE: 118 MMHG | HEIGHT: 74 IN | OXYGEN SATURATION: 98 % | DIASTOLIC BLOOD PRESSURE: 68 MMHG

## 2021-07-21 DIAGNOSIS — R79.89 LOW VITAMIN D LEVEL: ICD-10-CM

## 2021-07-21 DIAGNOSIS — F41.9 SEVERE ANXIETY: ICD-10-CM

## 2021-07-21 DIAGNOSIS — F32.2 SEVERE MAJOR DEPRESSION (HCC): ICD-10-CM

## 2021-07-21 DIAGNOSIS — F02.80 MAJOR NEUROCOGNITIVE DISORDER DUE TO MULTIPLE ETIOLOGIES (HCC): ICD-10-CM

## 2021-07-21 DIAGNOSIS — G35 MULTIPLE SCLEROSIS (HCC): Primary | ICD-10-CM

## 2021-07-21 DIAGNOSIS — Z86.69 HX OF SEIZURE DISORDER: ICD-10-CM

## 2021-07-21 PROCEDURE — 99215 OFFICE O/P EST HI 40 MIN: CPT | Performed by: PSYCHIATRY & NEUROLOGY

## 2021-07-21 NOTE — PROGRESS NOTES
Ga Fay (1985) is a 38 y.o. male, established patient, here for evaluation of the following     Chief complaint(s):   Chief Complaint   Patient presents with    Seizure     follow up change in meds    Multiple Sclerosis       SUBJECTIVE/ OBJECTIVE:    HPI: 38 y.o. male with hx of MS (extensive WM lesions in brain, some lesions in c-spine and t-spine)    1) MS  Last visit, pt signed forms to get started on Gilenya for MS. Pt reports that Gilenya services did come out to do his basic testing (EKG, labs) but he has not heard back from them to actually get first dose. He does have memory trouble so unclear if they called and he didn't answer/ return call. No new MS symptoms since last visit (6-16-21). 2) Remote hx of seizure (last reported seizure was 2016-7):   Continues to taper off Lamotrigine; currently on 100 mg two tablets a day  Continues to taper up on Topamax 100 mg, one tab three times a day  He knows target dose of Topamax is 200 mg twice a day  Denies any seizure since last visit     3) Chronic cognitive difficulty (? MS vs side effect from high-dose lamotrigine vs due to chronic marijuana use vs due to hx of chronic, excessive alcohol use): Normal CBC, B12, Folate, TSH, Lamictal level on 2-1-21. Pt says he stopped drinking alcohol one or two visits ago when we discussed it. He saw Dr Jose López Neuropsychology 2-13-90 for cognitive testing regarding his severe memory difficulty. See that report for details but in summary, Dr Lawrence Gil found to patient to have severe memory disturbance, that if due to an organic cause, would be severe dementia to the point where he would have to live in nursing home and need 24 hr care, which is not the case. He thought there was significant psychiatric illness (depr/ anxiety) and recommended pt start working with Psychiatrist and Counselor on those issues and then have repeat neuropsych testing later.      4) Low vitamin D level (low in April 2021; 22.5)  Pt says he hasn't started Vit D supplement that was sent in April 2021       Review of Systems: as above    ========================================    Brief Hx:     See initial visit 10- for full hx. Pt initial visit was to establish care for Seizure disorder. Had relocated from [place] 1 year before that visit, established care with PCP/ Dr Mic Garcia via [facility] in Sept 2020, reported to her he had hx of \"grand mal seizure,\" needed refill of his Lamotrigine rx, did not have a local neurologist, and requested a \"medical marijuana card\" for [place]. Pt had NO records from any prior providers/ Neurologist included. [facility] [place] [place] where patient had been admitted for two generalized seizures (one at home, witnessed by wife/ now ex-wife, one in ER). MRI Brain done during that admission described multiple white matter lesions, no acute lesions. He was seen by Neurologist / Dr Milli Hammer during that admission, EEG didn't show any epileptiform discharges, not started on AED as seizure was believed to be due to alcohol withdrawal.  He was to follow up in outpatient Neurology clinic but pt can't recall if he did, or what discussions were had about the MRI findings. 1) White matter lesions on Brain MRI without contrast (2-1-2021): Moderate amount of scattered  periventricular > subcortical T2/ FLAIR white matter lesions in a pattern strongly suggestive of demyelinating disease. Patient has hx of seizure disorder (started when he was 29 yo, while living in [place], [place]), is on high-dose Lamotrigine (three 100 mg tablets two times a day), and has chronic cognitive difficulty, says can't remember to most questions. When asked if he was diagnosed with MS in the past, he says can't remember.   On questioning, he recalls that in his early 25s, while living in [facility], he had an episode of left sided body numbness, admitted to a hospital somewhere in [place] (he has no records within the [facility] system  prior to his visit with me in Oct 2020), not sure if they did Brain MRI, not sure what was done/ outcome. Denies any episode of vision loss. Reports chronic, unsteady gait over 10 years. Falls easily. Reports right leg down to last two toes feels numb x 2 weeks. MRI Brain with and without contrast (3-2-2021): Stable white matter lesions with an appearance consistent with demyelinating disease. A white matter lesion in the left frontal deep white matter demonstrates next minimal restricted diffusion. No postcontrast enhancement to suggest active demyelination at this time. MRI Cervical spine with and without contrast (3-2-2021): There are scattered abnormal foci of increased T2 signal intensity in the cervical cord in the brainstem at the pontomedullary. Lesion at C2 measures approximately 15 mm in craniocaudal dimension lesion at C3 approximately 11 mm in craniocaudal dimension C6-7 lesion approximately 11 mm in craniocaudal dimension. No postcontrast enhancement to suggest active demyelination. No significant disc degeneration, no disc herniation, no spinal or foraminal stenosis, no facet arthropathy. MRI Thoracic spine with and without (3-2-2021): Scattered foci of abnormal increased cord signal intensity. Minimal cord thinning at the T7-T8, T11-T12 level. Imaging findings consistent with demyelinating disease. No postcontrast enhancement to suggest active demyelination. Multilevel degenerative change without significant canal compromise.       Lumbar puncture 4/22/2021: CSF ACE less than 1.5, Lyme antibody panel 1 IgG positive all other IgG and IgM negative (does not reach criteria for a positive test), Gram stain culture negative, VDRL negative, glucose 60, cell count 9 WBC/ 615 RBC, MS panel: 9 oligoclonal bands    Labs 4/22/2021: Vitamin D 22.5 (low), SSA and SSB antibodies are negative (less than 0.2), methylmalonic acid 134, cardiolipin antibody panel negative, LETI negative. Optho exam at [facility] (6-11-21): normal both sides    2) Hx of Seizure disorder: was on Lamotrigine 100 mg, three tabs twice a day at his initial visit with me. Reported last seizure was in [id number]. EEG done on 10-: normal awake, drowsy, brief sleep EEG. Due to possibility that high dose lamotrigine was causing/ contributing to cognitive difficulty, advised him to taper off that and onto Topamax. 3) Chronic cognitive difficulty (? MS vs side effect from lamictal/ high-dose lamictal vs due to chronic daily marijuana use):  Normal CBC, B12, Folate, TSH, Lamictal level. CMP normal except minimally elevated sodium 145. Has appt scheduled with Dr Maci Snyder on 3- for Neuropsych testing. No Known Allergies    Current Outpatient Medications   Medication Sig Dispense Refill    lamoTRIgine (LaMICtal) 100 mg tablet Take 3 tablets in morning and 2 tablets in evening. Anti-seizure medication. 150 Tablet 1    latanoprost (XALATAN) 0.005 % ophthalmic solution Administer 1 Drop to both eyes nightly.  topiramate (TOPAMAX) 50 mg tablet Take 2 Tablets by mouth two (2) times a day for 90 days. Anti-seizure medication 360 Tablet 1    ergocalciferol (ERGOCALCIFEROL) 1,250 mcg (50,000 unit) capsule Take 1 Cap by mouth every seven (7) days for 90 days. Indications: prevention of vitamin D deficiency (Patient not taking: Reported on 6/16/2021) 12 Cap 1       Past Medical History:   Diagnosis Date    Seizures ([address]         has a past surgical history that includes hx appendectomy and hx ankle fracture tx (1994).       Physical Exam:    Vitals:    07/21/21 1509   BP: 118/68   Pulse: 85   Height: 6' 2\" (1.88 m)   Weight: 85 kg (187 lb 8 oz)   SpO2: 98%       No exam performed this visit; spent entirety discussing multiple medical issues (see interval hx above)    ========================================    ASSESSMENT/ PLAN: ICD-10-CM ICD-9-CM    1. Multiple sclerosis ([address]  G35 340    2. Major neurocognitive disorder due to multiple etiologies ([address]  F02.80 294.9    3. Severe major depression (HCC)  F32.2 296.23    4. Severe anxiety  F41.9 300.00    5. Hx of seizure disorder  Z86.69 V12.49    6. Low vitamin D level  R79.89 790.6         1) Multiple Sclerosis (confirmed on Imaging and LP)  Pt has not started Gilenya yet  Asked my Nurse to contact [phone] [facility] patient support to inquire     2) Hx seizure (may be MS related):  None since 2016-7  Continue transition to topamax, off of lamotrigine    3) Chronic cognitive difficulty (multi-factorial: MS, long-term daily marijuana use, ? Medication side effect, ? Underlying psychiatric illness, hx of heavy alcohol use in the past). Printed contact information for [facility], circled the contact #, advised pt to call them today to set up his visit there. Once he sets up visit, advised him to call us back and let us know and will forward his Neuropsychology report to [facility]. 4) Low Vitamin D  Advised pt to start taking Vit D supplement      Follow up in 3 months      An electronic signature was used to authenticate this note.   -- Joseph Barrientos MD

## 2021-07-22 ENCOUNTER — TELEPHONE (OUTPATIENT)
Dept: NEUROLOGY | Age: 36
End: 2021-07-22

## 2021-07-22 NOTE — TELEPHONE ENCOUNTER
Spoke with Cory Levi re: getting patient his first dose of Gilenya. They need-  1) MD signed Approval to Schedule First Dose Observation form faxed back, and  2) PA completed. Form to Dr Mccain Estimable for signature. [facility], how is the PA going for the [address] [facility] [address]?

## 2021-07-29 ENCOUNTER — TELEPHONE (OUTPATIENT)
Dept: NEUROLOGY | Age: 36
End: 2021-07-29

## 2021-07-29 NOTE — TELEPHONE ENCOUNTER
Received fax from [address] is sched 8/8/21 @ 9:00 am at his home. Confirmed with patient and he will put the appointment on his fridge.

## 2021-08-02 ENCOUNTER — TELEPHONE (OUTPATIENT)
Dept: NEUROLOGY | Age: 36
End: 2021-08-02

## 2021-08-02 NOTE — TELEPHONE ENCOUNTER
Re alaina  Status null, please provide information to submitt tera dont see a prescription in e chart med file. Please update and advise ASAP.

## 2021-08-02 NOTE — TELEPHONE ENCOUNTER
Iggy Cotton [facility], called to check on the status of the prior [place]. [facility] stated that the patient is scheduled for FDO on 8/8/21 and wanted to know if it was approved or denied before then so that the patient will not be without medication for 14 days.  Best call back number: [phone]

## 2021-08-05 DIAGNOSIS — G35 MULTIPLE SCLEROSIS (HCC): Primary | ICD-10-CM

## 2021-08-05 RX ORDER — FINGOLIMOD HCL 0.5 MG/1
1 CAPSULE ORAL EVERY MORNING
Qty: 90 CAPSULE | Refills: 1 | Status: SHIPPED | OUTPATIENT
Start: 2021-08-05 | End: 2021-08-25 | Stop reason: SDUPTHER

## 2021-08-18 ENCOUNTER — TELEPHONE (OUTPATIENT)
Dept: NEUROLOGY | Age: 36
End: 2021-08-18

## 2021-08-18 NOTE — TELEPHONE ENCOUNTER
----- Message from Josh Randle sent at 8/18/2021  1:42 PM EDT -----  Regarding: Dr. Garcia Bar Message/Vendor Calls    Caller's first and last name: [place]      Reason for call: Would like to know the appeal status of the Gilenya Medication , it can be faxed to [phone] required yes/no and why: Yes       Best contact number(s): [phone]      Details to clarify the request:      Josh Randle

## 2021-08-19 NOTE — TELEPHONE ENCOUNTER
Spoke with Jannet with danitza smith to advise no approval yet.  Call placed to [phone] per Bhumi Reynolds

## 2021-08-23 ENCOUNTER — TELEPHONE (OUTPATIENT)
Dept: NEUROLOGY | Age: 36
End: 2021-08-23

## 2021-08-23 NOTE — TELEPHONE ENCOUNTER
----- Message from Haroldo Lindsay sent at 8/23/2021  3:58 PM EDT -----  Regarding: Dr. Eileen Levy  Patient return call    Caller's first and last name and relationship (if not the patient): Pt      Best contact number(s): [phone]      Whose call is being returned: Wellington Nichole      Details to clarify the request: Pt is requesting MS medication. He states he is not taking Gilenya. He stated this was never approved through his insurance. Currently he has no medication for his MS. He is noticing some side effects since he isn't taking anything. Please advise.       Haroldo Lindsay

## 2021-08-24 NOTE — TELEPHONE ENCOUNTER
Spoke with Sherman Alexander at [employer] [facility] and he states the patient needs a PA from [employer] [phone] and also needs a written RX faxed to [phone]

## 2021-08-24 NOTE — TELEPHONE ENCOUNTER
Called patient to advise [employer] [place] had sent another appeal urgent today. JERMAINE NEGRON secured.

## 2021-08-24 NOTE — TELEPHONE ENCOUNTER
RTC to patient, he states he needs his medication, gilenya, wants to know if the PA (he thinks) is approved yet. Advised patient I would check into this right away and let him know ASAP.

## 2021-08-25 ENCOUNTER — TELEPHONE (OUTPATIENT)
Dept: NEUROLOGY | Age: 36
End: 2021-08-25

## 2021-08-25 NOTE — TELEPHONE ENCOUNTER
----- Message from Crowley Patch sent at 8/25/2021  3:50 PM EDT -----  Regarding: Dr. Hilda Penny telephone  General Message/Vendor Calls    Caller's first and last name: Donna Lightning with [facility]      Reason for call: Prior Auth      Callback required yes/no and why: yes       Best contact number(s): [phone]      Details to clarify the request: [facility] is needing PA for Medication Gilenya 0.5 mg cap     Crowley Patch

## 2021-08-31 ENCOUNTER — TELEPHONE (OUTPATIENT)
Dept: NEUROLOGY | Age: 36
End: 2021-08-31

## 2021-08-31 NOTE — TELEPHONE ENCOUNTER
----- Message from Norene Schlatter sent at 8/30/2021  4:11 PM EDT -----  Regarding: /Telephone  Contact: [phone]  General Message/Vendor Calls    Caller's first and last name:Samia Hall      Reason for call:Please call back Samia regarding the pt's gilenya prescription.  Please update if there is an appeal status and please fax in results if received to [facility] at [phone]      Callback required yes/no and why:Yes, discuss      Best contact number(s):[phone]      Details to clarify the request:n/a      Norene Schlatter

## 2021-09-01 NOTE — TELEPHONE ENCOUNTER
----- Message from Miguel Westfall sent at 9/1/2021 10:57 AM EDT -----  Regarding: Dr. Asuncion Weber Message/Vendor Calls    Caller's first and last name: Daina Mendoza      Reason for call: PA needed for Gilenya      Callback required yes/no and why: No      Best contact number(s): [phone] ext: [phone]      Details to clarify the request: Arnold Rodriguez is calling to notify the office that patient's script for Gilenya 0.5mg cap. Pt's [facility] medicaid plan is who the PA will need to go through. She said that can be received over the phone.        Miguel Westfall

## 2021-09-01 NOTE — TELEPHONE ENCOUNTER
----- Message from [facility] sent at 9/1/2021 11:13 AM EDT -----  Regarding: Dr. Sudha Reyes telephone  General Message/Vendor Calls    Caller's first and last name: n/a       Reason for call: medication denial      Callback required yes/no and why: yes       Best contact number(s):[phone]        Details to clarify the request: He stated his insurance has denied his \"Delenia\" Medication for the second time now and would like to be contacted to discuss alternatives.       [facility]

## 2021-09-03 ENCOUNTER — DOCUMENTATION ONLY (OUTPATIENT)
Dept: NEUROLOGY | Age: 36
End: 2021-09-03

## 2021-09-03 NOTE — TELEPHONE ENCOUNTER
Spoke with patient to advise Dr Daphnie Ansari wrote a letter and it was faxed to [place] today for the appeal denial

## 2021-09-28 DIAGNOSIS — G35 MULTIPLE SCLEROSIS (HCC): ICD-10-CM

## 2021-09-28 NOTE — TELEPHONE ENCOUNTER
----- Message from [facility] sent at 9/28/2021 10:42 AM EDT -----  Regarding: Jean Ruffin Message/Vendor Calls    Caller's first and last name:      Reason for call: patient wants to know why his medication only approved for 6 ,Gilenya 25mg       Callback required yes/no and why:yes       Best contact number(s): [phone]      Details to clarify the request:       [employer]

## 2021-09-30 ENCOUNTER — TELEPHONE (OUTPATIENT)
Dept: NEUROLOGY | Age: 36
End: 2021-09-30

## 2021-09-30 RX ORDER — FINGOLIMOD HCL 0.5 MG/1
1 CAPSULE ORAL EVERY MORNING
Qty: 90 CAPSULE | Refills: 0 | Status: CANCELLED | OUTPATIENT
Start: 2021-09-30 | End: 2021-12-29

## 2021-09-30 NOTE — TELEPHONE ENCOUNTER
Spoke with rep for alaina he states the patient was sent a 14 day supply to hold him until the appeal is done.

## 2021-10-01 NOTE — TELEPHONE ENCOUNTER
Spoke with patient he did not mean he got 6 capsules he got approved for 6 months. He will  Rx but states he read the going off of the gilenya can cause spinal paralysis and wanted to know Dr Ryan Cuellar advise.

## 2021-10-04 RX ORDER — FINGOLIMOD HCL 0.5 MG/1
1 CAPSULE ORAL EVERY MORNING
Qty: 90 CAPSULE | Refills: 1 | Status: SHIPPED | OUTPATIENT
Start: 2021-10-04 | End: 2021-10-05 | Stop reason: SDUPTHER

## 2021-10-05 DIAGNOSIS — G35 MULTIPLE SCLEROSIS (HCC): ICD-10-CM

## 2021-10-06 ENCOUNTER — TELEPHONE (OUTPATIENT)
Dept: NEUROLOGY | Age: 36
End: 2021-10-06

## 2021-10-06 RX ORDER — FINGOLIMOD HCL 0.5 MG/1
1 CAPSULE ORAL EVERY MORNING
Qty: 90 CAPSULE | Refills: 1 | Status: SHIPPED | OUTPATIENT
Start: 2021-10-06 | End: 2021-10-11 | Stop reason: SDUPTHER

## 2021-10-06 NOTE — TELEPHONE ENCOUNTER
----- Message from Sachi Kapadia sent at 10/6/2021 10:47 AM EDT -----  Regarding: /telephone  General Message/Vendor Calls    Caller's first and last name: Shekhar Morgan)      Reason for call: to check status on an appeal       Callback required yes/no and why:yes to check status      Best contact number(s):[phone]      Details to clarify the request:FER Riggins ([facility]) called to speak with Dr. Violeta Fontenot, regarding on checking the appeal status on the pt 's mediation of \"Gilenya 0.5 mg cap\".          Sachi Kapadia

## 2021-10-07 NOTE — TELEPHONE ENCOUNTER
Called [facility] and spoke with Eugenia Barahona and she stated she would have someone reach back [facility] to the [facility] as the medication was approved and an RX was sent.

## 2021-10-08 ENCOUNTER — TELEPHONE (OUTPATIENT)
Dept: NEUROLOGY | Age: 36
End: 2021-10-08

## 2021-10-08 NOTE — TELEPHONE ENCOUNTER
Patient needed 14 day supply of Gilenya due to appeal and ok to give a verbal to Melvi Singh at [facility]. She states she will ship overnight. Will call patient to advise.

## 2021-10-08 NOTE — TELEPHONE ENCOUNTER
Can you let me know the details of his appeal on the gilenya. I spoke with Bibi smith and got him a 14 day supply because he will be out of medication. [facility] states they are waiting on the PA information.  Thanks

## 2021-10-11 DIAGNOSIS — G35 MULTIPLE SCLEROSIS (HCC): ICD-10-CM

## 2021-10-11 RX ORDER — FINGOLIMOD HCL 0.5 MG/1
1 CAPSULE ORAL EVERY MORNING
Qty: 90 CAPSULE | Refills: 3 | Status: SHIPPED | OUTPATIENT
Start: 2021-10-11 | End: 2022-01-09

## 2021-10-11 NOTE — TELEPHONE ENCOUNTER
Needs new prescription written for the [address] [facility]    Fax: [phone]

## 2021-10-12 RX ORDER — TOPIRAMATE 100 MG/1
100 TABLET, FILM COATED ORAL 2 TIMES DAILY
Qty: 180 TABLET | Refills: 0 | Status: SHIPPED | OUTPATIENT
Start: 2021-10-12 | End: 2021-10-15 | Stop reason: SDUPTHER

## 2021-10-21 ENCOUNTER — OFFICE VISIT (OUTPATIENT)
Dept: NEUROLOGY | Age: 36
End: 2021-10-21
Payer: MEDICAID

## 2021-10-21 ENCOUNTER — TELEPHONE (OUTPATIENT)
Dept: NEUROLOGY | Age: 36
End: 2021-10-21

## 2021-10-21 VITALS — SYSTOLIC BLOOD PRESSURE: 122 MMHG | RESPIRATION RATE: 20 BRPM | DIASTOLIC BLOOD PRESSURE: 82 MMHG

## 2021-10-21 DIAGNOSIS — G35 MULTIPLE SCLEROSIS (HCC): Primary | ICD-10-CM

## 2021-10-21 PROCEDURE — 99215 OFFICE O/P EST HI 40 MIN: CPT | Performed by: PSYCHIATRY & NEUROLOGY

## 2021-10-21 NOTE — TELEPHONE ENCOUNTER
Called patient to let him know I was putting in a lab trevor order in the mail to him and advised him where to go. Also faxed all of his notes and results to Dr Carlotta Coleman office per Dr Dotty Ling.

## 2021-10-21 NOTE — PROGRESS NOTES
Addison Stephen (1985) is a 43 y.o. male, established patient, here for evaluation of the following     Chief complaint(s):   Chief Complaint   Patient presents with    Follow-up     MS       SUBJECTIVE/ OBJECTIVE:    HPI: 43 y.o. male with hx of MS (extensive WM lesions in brain, some lesions in c-spine and t-spine)    1) MS  Pt has had significant insurance difficulty getting onto Gilenya. He has a hard time relaying (due to cognitive impairment) if/ when he started Gilenya, but says that at some point he got a short term prescription (patient assistance program), took it for 3 days then stopped taking it because got a letter from insurance saying it wouldn't be covered. He recently (unclear when) restarted it as he learned insurance is going to cover it and will be getting it from [facility] (not [facility] which is listed in his chart). Reports increased paresthesias in all extremities. Has chronic unsteady gait. No episode of vision loss since last visit. 2) Remote hx of seizure (last reported seizure was 2016-7): No seizures since last visit  Off of Lamotrigine now (instructed to taper off)  Is on Topamax now, 100 mg twice a day (sent 90 day Rx recently)     3) Chronic cognitive difficulty (? MS vs side effect from high-dose lamotrigine vs due to chronic marijuana use vs due to hx of chronic, excessive alcohol use):     Normal CBC, B12, Folate, TSH, Lamictal level on 2-1-21. Pt reported that he stopped drinking alcohol in early 2021 after we had discussed it. Saw Neuropsychologist/ Dr Da Bolton on 6-24-21 who's report indicated that patient was found to have severe memory disturbance and that if it was due to an organic cause he would have severe dementia to the point where he would have to live in nursing home and need 24 hr care, which is not the case.   He thought there was significant psychiatric illness (depression / anxiety) and recommended pt start working with Psychiatrist and Counselor on those issues and then have repeat neuropsych testing later. Pt says he saw [facility], they prescribed him Zoloft for daily use and Hydroxyzine HALF to 1 tab prn anxiety. Says he stopped using Zoloft/ caused him lethargy, takes Hydroxyzine one tab every morning. He is now thinking about it and says he might start taking the Zoloft at bedtime as it would help him sleep. 4) Low vitamin D level (low in April 2021; 22.5)  Have discussed in past him starting Vit D Supplement but unclear if he has started this     Review of Systems: as above    ========================================    Brief Hx:     See initial visit 10- for full hx. Pt initial visit was to establish care for Seizure disorder. Had relocated from [place] 1 year before that visit, established care with PCP/ Dr Elizabeth Matthews via [employer] in Sept 2020, reported to her he had hx of \"grand mal seizure,\" needed refill of his Lamotrigine rx, did not have a local neurologist, and requested a \"medical marijuana card\" for [place]. Pt had NO records from any prior providers/ Neurologist included. [facility] [place] [place] where patient had been admitted for two generalized seizures (one at home, witnessed by wife/ now ex-wife, one in ER). MRI Brain done during that admission described multiple white matter lesions, no acute lesions. He was seen by Neurologist / Dr Ann Albert during that admission, EEG didn't show any epileptiform discharges, not started on AED as seizure was believed to be due to alcohol withdrawal.  He was to follow up in outpatient Neurology clinic but pt can't recall if he did, or what discussions were had about the MRI findings. 1) White matter lesions on Brain MRI without contrast (2-1-2021):  Moderate amount of scattered  periventricular > subcortical T2/ FLAIR white matter lesions in a pattern strongly suggestive of demyelinating disease. Patient has hx of seizure disorder (started when he was 29 yo, while living in [place], [place]), is on high-dose Lamotrigine (three 100 mg tablets two times a day), and has chronic cognitive difficulty, says can't remember to most questions. When asked if he was diagnosed with MS in the past, he says can't remember. On questioning, he recalls that in his early 25s, while living in [place], he had an episode of left sided body numbness, admitted to a hospital somewhere in [place] (he has no records within the [employer] system  prior to his visit with me in Oct 2020), not sure if they did Brain MRI, not sure what was done/ outcome. Denies any episode of vision loss. Reports chronic, unsteady gait over 10 years. Falls easily. Reports right leg down to last two toes feels numb x 2 weeks. MRI Brain with and without contrast (3-2-2021): Stable white matter lesions with an appearance consistent with demyelinating disease. A white matter lesion in the left frontal deep white matter demonstrates next minimal restricted diffusion. No postcontrast enhancement to suggest active demyelination at this time. MRI Cervical spine with and without contrast (3-2-2021): There are scattered abnormal foci of increased T2 signal intensity in the cervical cord in the brainstem at the pontomedullary. Lesion at C2 measures approximately 15 mm in craniocaudal dimension lesion at C3 approximately 11 mm in craniocaudal dimension C6-7 lesion approximately 11 mm in craniocaudal dimension. No postcontrast enhancement to suggest active demyelination. No significant disc degeneration, no disc herniation, no spinal or foraminal stenosis, no facet arthropathy. MRI Thoracic spine with and without (3-2-2021): Scattered foci of abnormal increased cord signal intensity. Minimal cord thinning at the T7-T8, T11-T12 level. Imaging findings consistent with demyelinating disease.   No postcontrast enhancement to suggest active demyelination. Multilevel degenerative change without significant canal compromise. Lumbar puncture 4/22/2021: CSF ACE less than 1.5, Lyme antibody panel 1 IgG positive all other IgG and IgM negative (does not reach criteria for a positive test), Gram stain culture negative, VDRL negative, glucose 60, cell count 9 WBC/ 615 RBC, MS panel: 9 oligoclonal bands    Labs 4/22/2021: Vitamin D 22.5 (low), SSA and SSB antibodies are negative (less than 0.2), methylmalonic acid 134, cardiolipin antibody panel negative, LETI negative. Optho exam at [facility] (6-11-21): normal both sides    2) Hx of Seizure disorder: was on Lamotrigine 100 mg, three tabs twice a day at his initial visit with me. Reported last seizure was in [id number]. EEG done on 10-: normal awake, drowsy, brief sleep EEG. Due to possibility that high dose lamotrigine was causing/ contributing to cognitive difficulty, advised him to taper off that and onto Topamax. 3) Chronic cognitive difficulty (? MS vs side effect from lamictal/ high-dose lamictal vs due to chronic daily marijuana use):  Normal CBC, B12, Folate, TSH, Lamictal level. CMP normal except minimally elevated sodium 145. Has appt scheduled with Dr Trell Simpson on 3- for Neuropsych testing. No Known Allergies    Current Outpatient Medications   Medication Sig Dispense Refill    topiramate (Topamax) 100 mg tablet Take 1 Tablet by mouth two (2) times a day for 90 days. 180 Tablet 1    Gilenya 0.5 mg cap Take 1 Capsule by mouth Every morning for 90 days. Indications: relapsing form of multiple sclerosis 90 Capsule 3    latanoprost (XALATAN) 0.005 % ophthalmic solution Administer 1 Drop to both eyes nightly. Past Medical History:   Diagnosis Date    Seizures [facility])         has a past surgical history that includes hx appendectomy and hx ankle fracture tx (1994).       Physical Exam:    Vitals:    10/21/21 1353   BP: 122/82   Resp: 20 Awake alert resting. Calm, conversant. Has difficulty relating information. EOMI. Visual fields normal bilateral.  5 out of 5 strength in all extremities. Intact light touch in all extremities. Stands [place]. Gait appears normal.    ========================================    ASSESSMENT/ PLAN:       ICD-10-CM ICD-9-CM    1. Multiple sclerosis ([facility] [address]  G35 340 MRI BRAIN W WO CONT      MRI [facility] SPINE W WO CONT      MRI CERV SPINE W WO CONT      REFERRAL TO NEUROLOGY      AQUAPORIN-4 RECEPTOR AB      ANTI-MOG, SERUM      AQUAPORIN-4 RECEPTOR AB      ANTI-MOG, SERUM        1) Multiple Sclerosis (confirmed on Imaging and LP)    Continue Gilenya 0.5 mg daily. D/w patient that the paresthesias he's having may be due to tapering up on the Topamax. D/w him I don't thinks it's from the [address] as he has only recently started it. Ordered MRI Brain/ C-spine/ T-spine to be done in March 2022 (1 yr from last; 5 months from now) and pt will follow up here in 6 months. Pt says he has a close friend who works in the Psychology/ Neuropsychology department at [facility] and recommended to him that he get a 2nd opinion on the diagnosis of MS. Pt is on the border about pursuing this but I explained to him I have no problem with sending him to an [address] for a 2nd opinion. D/w him that both [facility] and [facility] have excellent MS clinics and that a few of MS Neurologists from [facility] are now at [facility] so it may be easier for him from a transportation standpoint to be seen at [facility]. He expressed understanding but was still on the fence.   I told him that my office will fax a referral and his neurology records to [address] [place] [address] who will reach out to him to schedule the visit, we will give him a copy of the referral order and will leave it up to him whether he ends up pursuing the referral.  I personally spoke to an MRI Tech at [facility] before closing this note and asked her to forward the MRIs (B, C, T) that were done on 3-2-2021 to [facility] PACS system so they are available for MS Neurologist there to review. Added serum NMO and MOG antibodies (to be done at [facility]) as those have not been checked yet    2) Hx seizure (may be MS related):  None since 2016-7  Stay on current dose of Topamax 100 mg BID    3) Chronic cognitive difficulty (multi-factorial: MS, long-term daily marijuana use, ? Medication side effect, ? Underlying psychiatric illness, hx of heavy alcohol use in the past). Advised pt to continue working with [facility]     4) Follow up in 6 months        An electronic signature was used to authenticate this note.   -- Tonny Butler MD

## 2021-10-23 ENCOUNTER — PATIENT MESSAGE (OUTPATIENT)
Dept: NEUROLOGY | Age: 36
End: 2021-10-23

## 2021-10-27 ENCOUNTER — PATIENT MESSAGE (OUTPATIENT)
Dept: NEUROLOGY | Age: 36
End: 2021-10-27

## 2021-10-27 DIAGNOSIS — G35 MULTIPLE SCLEROSIS (HCC): Primary | ICD-10-CM

## 2021-11-02 ENCOUNTER — TELEPHONE (OUTPATIENT)
Dept: NEUROLOGY | Age: 36
End: 2021-11-02

## 2021-11-02 NOTE — TELEPHONE ENCOUNTER
----- Message from [employer] sent at 11/2/2021  2:54 PM EDT -----  Regarding: /Telephone  General Message/Vendor Calls         Caller's first and last name: Miguel Jiang From [facility]               Reason for call: Pt states they went to ER on 10/29 and are no longer taking Aiden Re , and they received an enrollment form and needs to know if they should close the case or what direction should they go in              Callback required yes/no and why: Yes               Best contact number(s): [phone] (Can Leave VM on Secured VM)               Details to clarify the request:  Pt states they went to ER on 10/29 and are no longer taking Aiden Re , and they received an enrollment form and needs to know if they should close the case or what direction should they go in                [facility]

## 2021-11-03 ENCOUNTER — TELEPHONE (OUTPATIENT)
Dept: NEUROLOGY | Age: 36
End: 2021-11-03

## 2021-11-03 NOTE — TELEPHONE ENCOUNTER
----- Message from Jackie Plunkett sent at 11/3/2021  8:57 AM EDT -----  Regarding: dr limon/ telephone  General Message/Vendor Calls    Caller's first and last name: ana from [facility] neuro      Reason for call: needs to know where the pt's radiology images were completed at so they can be requested       Callback required yes/no and why: yes      Best contact number(s): [phone]      Details to clarify the request:      Elkin Notice

## 2021-11-06 ENCOUNTER — HOSPITAL ENCOUNTER (OUTPATIENT)
Dept: MRI IMAGING | Age: 36
Discharge: HOME | End: 2021-11-06
Attending: PSYCHIATRY & NEUROLOGY
Payer: MEDICAID

## 2021-11-06 VITALS — WEIGHT: 175 LBS | BODY MASS INDEX: 22.47 KG/M2

## 2021-11-06 DIAGNOSIS — G35 MULTIPLE SCLEROSIS (HCC): ICD-10-CM

## 2021-11-06 PROCEDURE — 72156 MRI NECK SPINE W/O & W/DYE: CPT

## 2021-11-06 PROCEDURE — 74011250636 HC RX REV CODE- 250/636: Performed by: RADIOLOGY

## 2021-11-06 PROCEDURE — A9576 INJ PROHANCE MULTIPACK: HCPCS | Performed by: RADIOLOGY

## 2021-11-06 PROCEDURE — 72157 MRI CHEST SPINE W/O & W/DYE: CPT

## 2021-11-06 PROCEDURE — 70553 MRI BRAIN STEM W/O & W/DYE: CPT

## 2021-11-06 RX ADMIN — GADOTERIDOL 15 ML: 279.3 INJECTION, SOLUTION INTRAVENOUS at 16:16

## 2022-01-07 ENCOUNTER — PATIENT MESSAGE (OUTPATIENT)
Dept: NEUROLOGY | Age: 37
End: 2022-01-07

## 2022-02-17 ENCOUNTER — TELEPHONE (OUTPATIENT)
Dept: NEUROLOGY | Age: 37
End: 2022-02-17

## 2022-04-21 ENCOUNTER — OFFICE VISIT (OUTPATIENT)
Dept: NEUROLOGY | Age: 37
End: 2022-04-21
Payer: MEDICAID

## 2022-04-21 VITALS
OXYGEN SATURATION: 99 % | DIASTOLIC BLOOD PRESSURE: 60 MMHG | BODY MASS INDEX: 20.69 KG/M2 | WEIGHT: 161.2 LBS | HEIGHT: 74 IN | HEART RATE: 60 BPM | SYSTOLIC BLOOD PRESSURE: 110 MMHG

## 2022-04-21 DIAGNOSIS — G40.909 NONINTRACTABLE EPILEPSY WITHOUT STATUS EPILEPTICUS, UNSPECIFIED EPILEPSY TYPE (HCC): Primary | ICD-10-CM

## 2022-04-21 DIAGNOSIS — G35 MULTIPLE SCLEROSIS (HCC): ICD-10-CM

## 2022-04-21 DIAGNOSIS — F09 COGNITIVE DISORDER: ICD-10-CM

## 2022-04-21 PROCEDURE — 99215 OFFICE O/P EST HI 40 MIN: CPT | Performed by: PSYCHIATRY & NEUROLOGY

## 2022-04-21 RX ORDER — TOPIRAMATE 100 MG/1
100 TABLET, FILM COATED ORAL 2 TIMES DAILY
Qty: 180 TABLET | Refills: 0 | Status: SHIPPED | OUTPATIENT
Start: 2022-04-21 | End: 2022-07-20

## 2022-04-21 RX ORDER — SERTRALINE HYDROCHLORIDE 50 MG/1
50 TABLET, FILM COATED ORAL DAILY
COMMUNITY
Start: 2022-03-16

## 2022-04-21 RX ORDER — DIAZEPAM 7.5 MG/100UL
7.5 SPRAY NASAL
Qty: 4 EACH | Refills: 2 | Status: SHIPPED | OUTPATIENT
Start: 2022-04-21 | End: 2022-04-21

## 2022-04-21 NOTE — PROGRESS NOTES
Tino Duarte (1985) is a 43 y.o. male, established patient, here for evaluation of the following     Chief complaint(s):   Chief Complaint   Patient presents with    Follow-up    Multiple Sclerosis       SUBJECTIVE/ OBJECTIVE:     HPI: 43 y.o. male with hx of MS (extensive WM lesions in brain, some lesions in c-spine and t-spine)    Accompanied by Moo today. 1) MS  Has seen [facility] MS Specialist (Dr Lou Bernabe) and had already scheduled appt at [address] [place] [address] so he plans to follow through with that initial visit and will then determine where he will follow for MS care. Not currently on an MS medication. 2) Hx of Seizure  Didn't have any seizure from 2016-17 until Oct 2021. Moo says that he had cluster of seizure on 10- (one seizure) and 10- (two seizure) and was taken to [facility], admitted there overnight. Moo says that the seizures occurred after he was started on Gilenya, and that was d/c'd during hospital admission. He was continued on Topamax 100 mg twice a day. She reports him having more seizures since then: 2-20-22 (1 GTC at night, 1 GTC in early AM), 3-1-2022 (1 GTC in AM). None since then. Last reported seizures were in 2016-7.         ========================================    Brief Hx:     See initial visit 10- for full hx. Pt initial visit was to establish care for Seizure disorder. Had relocated from [facility] 1 year before that visit, established care with PCP/ Dr Jazzy Velázquez via [employer] in Sept 2020, reported to her he had hx of \"grand mal seizure,\" needed refill of his Lamotrigine rx, did not have a local neurologist, and requested a \"medical marijuana card\" for [place]. Pt had NO records from any prior providers/ Neurologist included.   Obt[facility] a [facility] [place] [place] in 2011 where patient had been admitted for two generalized seizures (one at home, witnessed by wife/ now ex-wife, one in ER).  MRI Brain done during that admission described multiple white matter lesions, no acute lesions. He was seen by Neurologist / Dr Ramón Quiñonez during that admission, EEG didn't show any epileptiform discharges, not started on AED as seizure was believed to be due to alcohol withdrawal.  He was to follow up in outpatient Neurology clinic but pt can't recall if he did, or what discussions were had about the MRI findings. 1) White matter lesions on Brain MRI without contrast (2-1-2021): Moderate amount of scattered  periventricular > subcortical T2/ FLAIR white matter lesions in a pattern strongly suggestive of demyelinating disease. Patient has hx of seizure disorder (started when he was 29 yo, while living in [place], [place]), is on high-dose Lamotrigine (three 100 mg tablets two times a day), and has chronic cognitive difficulty, says can't remember to most questions. When asked if he was diagnosed with MS in the past, he says can't remember. On questioning, he recalls that in his early 25s, while living in [place], he had an episode of left sided body numbness, admitted to a hospital somewhere in [place] (he has no records within the [employer] system  prior to his visit with me in Oct 2020), not sure if they did Brain MRI, not sure what was done/ outcome. Denies any episode of vision loss. Reports chronic, unsteady gait over 10 years. Falls easily. Reports right leg down to last two toes feels numb x 2 weeks. MRI Brain with and without contrast (3-2-2021): Stable white matter lesions with an appearance consistent with demyelinating disease. A white matter lesion in the left frontal deep white matter demonstrates next minimal restricted diffusion. No postcontrast enhancement to suggest active demyelination at this time. MRI Cervical spine with and without contrast (3-2-2021):  There are scattered abnormal foci of increased T2 signal intensity in the cervical cord in the brainstem at the pontomedullary. Lesion at C2 measures approximately 15 mm in craniocaudal dimension lesion at C3 approximately 11 mm in craniocaudal dimension C6-7 lesion approximately 11 mm in craniocaudal dimension. No postcontrast enhancement to suggest active demyelination. No significant disc degeneration, no disc herniation, no spinal or foraminal stenosis, no facet arthropathy. MRI Thoracic spine with and without (3-2-2021): Scattered foci of abnormal increased cord signal intensity. Minimal cord thinning at the T7-T8, T11-T12 level. Imaging findings consistent with demyelinating disease. No postcontrast enhancement to suggest active demyelination. Multilevel degenerative change without significant canal compromise. Lumbar puncture 4/22/2021: CSF ACE less than 1.5, Lyme antibody panel 1 IgG positive all other IgG and IgM negative (does not reach criteria for a positive test), Gram stain culture negative, VDRL negative, glucose 60, cell count 9 WBC/ 615 RBC, MS panel: 9 oligoclonal bands    Labs 4/22/2021: Vitamin D 22.5 (low), SSA and SSB antibodies are negative (less than 0.2), methylmalonic acid 134, cardiolipin antibody panel negative, LETI negative. Optho exam at [facility] (6-11-21): normal both sides    2) Hx of Seizure disorder: was on Lamotrigine 100 mg, three tabs twice a day at his initial visit with me. Reported last seizure was in [id number]. EEG done on 10-: normal awake, drowsy, brief sleep EEG. Due to possibility that high dose lamotrigine was causing/ contributing to cognitive difficulty, advised him to taper off that and onto Topamax. 3) Chronic cognitive difficulty (? MS vs side effect from lamictal/ high-dose lamictal vs due to chronic daily marijuana use vs hx of chronic, excessive alcohol use):  Normal CBC, B12, Folate, TSH, Lamictal level in Oct 2021. Was on high-dose Lamotrigine 100 mg three tablets twice a day as AED when he established care.   Consideration was made that the high-does Lamotrigine was a possible cause of his cognitive difficulty so he was tapered off that (around Oct 202) and started on Topamax. Cognitive difficulty persisted. Normal CBC, B12, Folate, TSH, Lamictal level on 2-1-21. He had also reportedly stopped drinking alcohol in early 2021. Saw Neuropsychologist/ Dr Alicia De La Cruz on 6-24-21 who's report indicated that patient was found to have severe memory disturbance and that if it was due to an organic cause he would have severe dementia to the point where he would have to live in nursing home and need 24 hr care, which is not the case. He thought there was significant psychiatric illness (depression / anxiety) and recommended pt start working with Psychiatrist and Counselor on those issues and then have repeat neuropsych testing later. Pt reported he started working with [address]      No Known Allergies    Current Outpatient Medications   Medication Sig Dispense Refill    sertraline (ZOLOFT) 50 mg tablet Take 50 mg by mouth daily.  diazePAM (Valtoco) 15 mg/2 spray (7.5/0.1mL x 2) spry 7.5 mg by Both Nostrils route once as needed (for acute, repetitive seizures. Call 911. May repeat one spray in each nostril after 4 hours if seizures. recur.) for up to 1 dose. Max Daily Amount: 7.5 mg. Indications: acute repetitive seizures 4 Each 2    topiramate (TOPAMAX) 100 mg tablet Take 1 Tablet by mouth two (2) times a day for 90 days. 180 Tablet 0    ergocalciferol (ERGOCALCIFEROL) 1,250 mcg (50,000 unit) capsule Take 1 Capsule by mouth every seven (7) days for 90 days. Indications: prevention of vitamin D deficiency 12 Capsule 0    latanoprost (XALATAN) 0.005 % ophthalmic solution Administer 1 Drop to both eyes nightly. Past Medical History:   Diagnosis Date    Seizures [facility])         has a past surgical history that includes hx appendectomy and hx ankle fracture tx (1994).       Physical Exam:    Vitals:    04/21/22 1548   BP: 110/60   Pulse: 60   Height: 6' 2\" (1.88 m)   Weight: 73.1 kg (161 lb 3.2 oz)   SpO2: 99%     No exam today. Entirety of visit spent discussing patient seeing [facility] MS clinic and going to see [facility] MS clinic, and separately, Seizure.      ========================================    ASSESSMENT/ PLAN:       ICD-10-CM ICD-9-CM    1. Nonintractable epilepsy without status epilepticus, unspecified epilepsy type ([facility] [address]  G40.909 345.90 diazePAM (Valtoco) 15 mg/2 spray (7.5/0.1mL x 2) spry      topiramate (TOPAMAX) 100 mg tablet   2. Multiple sclerosis ([facility]  G35 340    3. Cognitive disorder  F09 294.9       1) Epilepsy, non-intractable    Discussed changing back to Lamotrigine as 1) his cognitive difficulty didn't improve after coming off of it, and 2) it had worked to suppress his seizures. Pt considered and says he wants to stick with Topamax for now and if further seizures, will reconsider changing back to Lamotrigine. D/w him and fiance that I'm sending him in a Rx of Valtoco (Diazepam Nasal Spray) to use if he were to have one seizure lasting longer than 5 minutes, or back to back seizures, and if he were to use the Valtoco, that 911 should be called. They expressed understanding. 2) Multiple Sclerosis (confirmed on Imaging and LP)  Per MS specialist ([facility] or [facility] whichever patient chooses)    3) Chronic cognitive difficulty (multi-factorial: MS, long-term daily marijuana use, ? Medication side effect, ? Underlying psychiatric illness, hx of heavy alcohol use in the past). Advised pt to continue working with [address]     4) Follow up in 3 months        An electronic signature was used to authenticate this note.   -- Andres Leahy MD

## 2022-05-03 ENCOUNTER — PATIENT MESSAGE (OUTPATIENT)
Dept: NEUROLOGY | Age: 37
End: 2022-05-03

## 2022-05-03 DIAGNOSIS — G40.909 NONINTRACTABLE EPILEPSY WITHOUT STATUS EPILEPTICUS, UNSPECIFIED EPILEPSY TYPE (HCC): Primary | ICD-10-CM

## 2022-05-03 RX ORDER — LAMOTRIGINE 25 MG/1
TABLET ORAL
Qty: 70 TABLET | Refills: 0 | Status: SHIPPED | OUTPATIENT
Start: 2022-05-03 | End: 2022-06-06

## 2022-05-03 NOTE — TELEPHONE ENCOUNTER
From: Lillian Quevedo  To: Aurelia Dunbar MD  Sent: 5/3/2022 9:03 AM EDT  Subject: Switch medicine     I need to switch back to my previous seizure medication lamotrigine, I had a seizure Monday morning.

## 2022-05-18 ENCOUNTER — PATIENT MESSAGE (OUTPATIENT)
Dept: NEUROLOGY | Age: 37
End: 2022-05-18

## 2022-06-05 ENCOUNTER — PATIENT MESSAGE (OUTPATIENT)
Dept: NEUROLOGY | Age: 37
End: 2022-06-05

## 2022-06-05 DIAGNOSIS — G40.909 NONINTRACTABLE EPILEPSY WITHOUT STATUS EPILEPTICUS, UNSPECIFIED EPILEPSY TYPE (HCC): Primary | ICD-10-CM

## 2022-06-06 RX ORDER — LAMOTRIGINE 100 MG/1
TABLET ORAL
Qty: 76 TABLET | Refills: 0 | Status: SHIPPED | OUTPATIENT
Start: 2022-06-06 | End: 2022-07-06

## 2022-06-06 NOTE — TELEPHONE ENCOUNTER
From: Alexandria Collier  To: Salty Griggs MD  Sent: 6/5/2022 11:09 AM EDT  Subject: Lamotrigine refill? Ive started the 5th and final week. Im taking 25mg 2 tabs twice a day. Im still taking Topiramate 100mg 2 tabs.

## 2022-06-17 ENCOUNTER — TELEPHONE (OUTPATIENT)
Dept: NEUROLOGY | Age: 37
End: 2022-06-17

## 2022-07-11 ENCOUNTER — PATIENT MESSAGE (OUTPATIENT)
Dept: NEUROLOGY | Age: 37
End: 2022-07-11

## 2022-07-13 ENCOUNTER — PATIENT MESSAGE (OUTPATIENT)
Dept: NEUROLOGY | Age: 37
End: 2022-07-13

## 2022-07-13 RX ORDER — LAMOTRIGINE 100 MG/1
150 TABLET ORAL 2 TIMES DAILY
Qty: 90 TABLET | Refills: 2 | Status: SHIPPED | OUTPATIENT
Start: 2022-07-13

## 2022-07-13 NOTE — TELEPHONE ENCOUNTER
Spoke with patient and he states he moves up in titration and he just finished (16 days) at  100 mg 1.5 tablets BID

## 2022-08-29 ENCOUNTER — DOCUMENTATION ONLY (OUTPATIENT)
Dept: NEUROLOGY | Age: 37
End: 2022-08-29

## 2022-08-29 DIAGNOSIS — E55.9 VITAMIN D DEFICIENCY: Primary | ICD-10-CM

## 2022-08-29 RX ORDER — ERGOCALCIFEROL 1.25 MG/1
50000 CAPSULE ORAL
Qty: 12 CAPSULE | Refills: 1 | Status: SHIPPED | OUTPATIENT
Start: 2022-08-29 | End: 2022-11-27

## 2022-08-29 NOTE — PROGRESS NOTES
Patient returned office's call about scheduling follow up visit 6 months from last.       Pt reported he has switched all his Neurology care to [facility].       As such, I will let [facility] address his MS, Epilepsy, and any other neurologic symptoms/ issues

## 2023-09-21 ENCOUNTER — TELEPHONE (OUTPATIENT)
Dept: PRIMARY CARE CLINIC | Facility: CLINIC | Age: 38
End: 2023-09-21

## 2023-09-21 NOTE — TELEPHONE ENCOUNTER
Called and left a voicemail message to reschedule 10/16/23 appointment due to provider not in office